# Patient Record
Sex: MALE | Race: WHITE | NOT HISPANIC OR LATINO
[De-identification: names, ages, dates, MRNs, and addresses within clinical notes are randomized per-mention and may not be internally consistent; named-entity substitution may affect disease eponyms.]

---

## 2018-10-13 ENCOUNTER — APPOINTMENT (OUTPATIENT)
Dept: ULTRASOUND IMAGING | Facility: IMAGING CENTER | Age: 78
End: 2018-10-13
Payer: MEDICARE

## 2018-10-13 ENCOUNTER — OUTPATIENT (OUTPATIENT)
Dept: OUTPATIENT SERVICES | Facility: HOSPITAL | Age: 78
LOS: 1 days | End: 2018-10-13
Payer: MEDICARE

## 2018-10-13 DIAGNOSIS — Z00.8 ENCOUNTER FOR OTHER GENERAL EXAMINATION: ICD-10-CM

## 2018-10-13 PROCEDURE — 76770 US EXAM ABDO BACK WALL COMP: CPT

## 2018-10-13 PROCEDURE — 76770 US EXAM ABDO BACK WALL COMP: CPT | Mod: 26

## 2019-04-25 ENCOUNTER — OUTPATIENT (OUTPATIENT)
Dept: OUTPATIENT SERVICES | Facility: HOSPITAL | Age: 79
LOS: 1 days | End: 2019-04-25
Payer: MEDICARE

## 2019-04-25 ENCOUNTER — APPOINTMENT (OUTPATIENT)
Dept: CT IMAGING | Facility: IMAGING CENTER | Age: 79
End: 2019-04-25
Payer: MEDICARE

## 2019-04-25 DIAGNOSIS — R10.9 UNSPECIFIED ABDOMINAL PAIN: ICD-10-CM

## 2019-04-25 PROCEDURE — 74176 CT ABD & PELVIS W/O CONTRAST: CPT

## 2019-04-25 PROCEDURE — 74176 CT ABD & PELVIS W/O CONTRAST: CPT | Mod: 26

## 2021-03-09 ENCOUNTER — RESULT CHARGE (OUTPATIENT)
Age: 81
End: 2021-03-09

## 2021-03-10 ENCOUNTER — NON-APPOINTMENT (OUTPATIENT)
Age: 81
End: 2021-03-10

## 2021-03-10 ENCOUNTER — APPOINTMENT (OUTPATIENT)
Dept: INTERNAL MEDICINE | Facility: CLINIC | Age: 81
End: 2021-03-10
Payer: MEDICARE

## 2021-03-10 ENCOUNTER — LABORATORY RESULT (OUTPATIENT)
Age: 81
End: 2021-03-10

## 2021-03-10 VITALS
OXYGEN SATURATION: 98 % | TEMPERATURE: 97.6 F | BODY MASS INDEX: 31.41 KG/M2 | HEART RATE: 61 BPM | HEIGHT: 64 IN | WEIGHT: 184 LBS | DIASTOLIC BLOOD PRESSURE: 77 MMHG | SYSTOLIC BLOOD PRESSURE: 128 MMHG

## 2021-03-10 DIAGNOSIS — Z78.9 OTHER SPECIFIED HEALTH STATUS: ICD-10-CM

## 2021-03-10 DIAGNOSIS — Z82.49 FAMILY HISTORY OF ISCHEMIC HEART DISEASE AND OTHER DISEASES OF THE CIRCULATORY SYSTEM: ICD-10-CM

## 2021-03-10 PROCEDURE — 36415 COLL VENOUS BLD VENIPUNCTURE: CPT

## 2021-03-10 PROCEDURE — G0439: CPT

## 2021-03-10 RX ORDER — FINASTERIDE 5 MG/1
5 TABLET, FILM COATED ORAL DAILY
Refills: 0 | Status: ACTIVE | COMMUNITY
Start: 2021-03-10

## 2021-03-10 RX ORDER — ALFUZOSIN HYDROCHLORIDE 10 MG/1
10 TABLET, EXTENDED RELEASE ORAL DAILY
Refills: 0 | Status: ACTIVE | COMMUNITY

## 2021-03-10 NOTE — ASSESSMENT
[FreeTextEntry1] : 80 year old male with h/o CAD s/p stent placement in 2016, BPH, presents for initial annual exam.\par Was following with PMD in [place], Dr. Shaikh, but he has recently moved to [place].  To establishing care\par received covid vaccine, \par Overall doing well.  No acute complaints at this time.\par Continues to stay active attending gym several times a week and walking in the neighborhood.  No associated shortness of breath or chest pain\par Up-to-date on all screening tool\par \par CAD, s/p stent placement in 2016, Recently seen by cardiology in January, received clean bill health.\par -Continue meds as directed\par \par BPH.  Recently seen by urologist, Dr. Leroy Candelario, was advised has been retaining some urine.  Has follow-up upcoming\par -Advised to have consult sent over to the office to review\par \par Gout, on allopurinol.  Last attack 17 years ago\par -We'll check uric acid level, may benefit from titrating down allopurinol as on maximum dose\par \par Annual \par -Advised to get yearly Flu shot -up-to-date\par -Advised Yearly Eye exam with Ophthalmologist\par -Advised Yearly Dental exam\par -Educated of the importance of Healthy diet, such as Mediterranean Diet and Exercise, such as walking >20 minutes a day and increasing gradually as tolerated\par \par Preventative screening \par -advised to get colonoscopy for colon cancer [id number]-date\par -will check PSA for prostate cancer screening-labs drawn\par \par Follow-up in 6 months\par \par \par \par \par \par \par \par \par \par \par \par \par

## 2021-03-10 NOTE — HISTORY OF PRESENT ILLNESS
[FreeTextEntry1] : cardio- nick papapietro\par urology- kathy strickland\par opthalmologist- mary kate  [de-identified] : 80 year old male with h/o CAD s/p stent placement in 2016, BPH, presents for initial annual exam.\par Was following with PMD in [place], Dr. Shaikh, but he has recently moved to [place].  To establishing care\par received covid vaccine, \par \par Overall doing well.  No acute complaints at this time.\par \par Recently seen by cardiology in January, received clean bill health.\par \par Recently seen by urology due to BPH.  Was advised has been retaining some urine.  Has follow-up upcoming\par \par Has been compliant with medications\par \par Continues to stay active attending gym several times a week and walking in the neighborhood.  No associated shortness of breath or chest pain\par \par \par \par \par \par

## 2021-03-10 NOTE — PHYSICAL EXAM
[Normal] : normal rate, regular rhythm, normal S1 and S2 and no murmur heard [No Varicosities] : no varicosities [Pedal Pulses Present] : the pedal pulses are present [No Edema] : there was no peripheral edema [No Extremity Clubbing/Cyanosis] : no extremity clubbing/cyanosis [Normal Appearance] : normal in appearance [No Masses] : no palpable masses [Soft] : abdomen soft [Non Tender] : non-tender [Non-distended] : non-distended [Normal Bowel Sounds] : normal bowel sounds [Normal Posterior Cervical Nodes] : no posterior cervical lymphadenopathy [Normal Anterior Cervical Nodes] : no anterior cervical lymphadenopathy [No CVA Tenderness] : no CVA  tenderness [No Spinal Tenderness] : no spinal tenderness [No Joint Swelling] : no joint swelling [Grossly Normal Strength/Tone] : grossly normal strength/tone [No Rash] : no rash [Coordination Grossly Intact] : coordination grossly intact [No Focal Deficits] : no focal deficits [Normal Gait] : normal gait

## 2021-03-10 NOTE — HEALTH RISK ASSESSMENT
[Good] : ~his/her~  mood as  good [No falls in past year] : Patient reported no falls in the past year [0] : 2) Feeling down, depressed, or hopeless: Not at all (0) [No Retinopathy] : No retinopathy [Patient reported colonoscopy was normal] : Patient reported colonoscopy was normal [Retired] : retired [] :  [Feels Safe at Home] : Feels safe at home [Fully functional (bathing, dressing, toileting, transferring, walking, feeding)] : Fully functional (bathing, dressing, toileting, transferring, walking, feeding) [Fully functional (using the telephone, shopping, preparing meals, housekeeping, doing laundry, using] : Fully functional and needs no help or supervision to perform IADLs (using the telephone, shopping, preparing meals, housekeeping, doing laundry, using transportation, managing medications and managing finances) [] : No [[id number]] : 0 [EyeExamDate] : 1/2021 [Change in mental status noted] : No change in mental status noted [Reports changes in hearing] : Reports no changes in hearing [Reports changes in vision] : Reports no changes in vision [Reports changes in dental health] : Reports no changes in dental health [ColonoscopyDate] : 2018

## 2021-03-11 LAB
25(OH)D3 SERPL-MCNC: 42.4 NG/ML
ALBUMIN SERPL ELPH-MCNC: 4.7 G/DL
ALP BLD-CCNC: 78 U/L
ALT SERPL-CCNC: 31 U/L
ANION GAP SERPL CALC-SCNC: 8 MMOL/L
APPEARANCE: CLEAR
AST SERPL-CCNC: 28 U/L
BACTERIA: NEGATIVE
BASOPHILS # BLD AUTO: 0.06 K/UL
BASOPHILS NFR BLD AUTO: 0.7 %
BILIRUB DIRECT SERPL-MCNC: 0.2 MG/DL
BILIRUB INDIRECT SERPL-MCNC: 0.4 MG/DL
BILIRUB SERPL-MCNC: 0.6 MG/DL
BILIRUBIN URINE: NEGATIVE
BLOOD URINE: NEGATIVE
BUN SERPL-MCNC: 27 MG/DL
CALCIUM SERPL-MCNC: 9.9 MG/DL
CHLORIDE SERPL-SCNC: 103 MMOL/L
CHOLEST SERPL-MCNC: 107 MG/DL
CO2 SERPL-SCNC: 29 MMOL/L
COLOR: NORMAL
CREAT SERPL-MCNC: 1.19 MG/DL
EOSINOPHIL # BLD AUTO: 0.22 K/UL
EOSINOPHIL NFR BLD AUTO: 2.7 %
ESTIMATED AVERAGE GLUCOSE: 126 MG/DL
FERRITIN SERPL-MCNC: 357 NG/ML
FOLATE SERPL-MCNC: 13.3 NG/ML
GLUCOSE QUALITATIVE U: NEGATIVE
GLUCOSE SERPL-MCNC: 110 MG/DL
HBA1C MFR BLD HPLC: 6 %
HCT VFR BLD CALC: 48.8 %
HCV AB SER QL: NONREACTIVE
HCV S/CO RATIO: 0.07 S/CO
HDLC SERPL-MCNC: 49 MG/DL
HGB BLD-MCNC: 15.6 G/DL
HIV1+2 AB SPEC QL IA.RAPID: NONREACTIVE
HYALINE CASTS: 0 /LPF
IMM GRANULOCYTES NFR BLD AUTO: 0.4 %
IRON SATN MFR SERPL: 53 %
IRON SERPL-MCNC: 149 UG/DL
KETONES URINE: NEGATIVE
LDLC SERPL CALC-MCNC: 41 MG/DL
LEUKOCYTE ESTERASE URINE: NEGATIVE
LYMPHOCYTES # BLD AUTO: 1.6 K/UL
LYMPHOCYTES NFR BLD AUTO: 19.7 %
MAN DIFF?: NORMAL
MCHC RBC-ENTMCNC: 30.1 PG
MCHC RBC-ENTMCNC: 32 GM/DL
MCV RBC AUTO: 94 FL
MICROSCOPIC-UA: NORMAL
MONOCYTES # BLD AUTO: 0.65 K/UL
MONOCYTES NFR BLD AUTO: 8 %
NEUTROPHILS # BLD AUTO: 5.58 K/UL
NEUTROPHILS NFR BLD AUTO: 68.5 %
NITRITE URINE: NEGATIVE
NONHDLC SERPL-MCNC: 58 MG/DL
PH URINE: 6
PLATELET # BLD AUTO: 399 K/UL
POTASSIUM SERPL-SCNC: 5.4 MMOL/L
PROT SERPL-MCNC: 7.3 G/DL
PROTEIN URINE: NEGATIVE
PSA SERPL-MCNC: 1.46 NG/ML
RBC # BLD: 5.19 M/UL
RBC # FLD: 13 %
RED BLOOD CELLS URINE: 0 /HPF
SODIUM SERPL-SCNC: 140 MMOL/L
SPECIFIC GRAVITY URINE: 1.02
SQUAMOUS EPITHELIAL CELLS: 0 /HPF
TIBC SERPL-MCNC: 282 UG/DL
TRIGL SERPL-MCNC: 85 MG/DL
TSH SERPL-ACNC: 3.76 UIU/ML
UIBC SERPL-MCNC: 133 UG/DL
URATE SERPL-MCNC: 3 MG/DL
UROBILINOGEN URINE: NORMAL
VIT B12 SERPL-MCNC: 559 PG/ML
WBC # FLD AUTO: 8.14 K/UL
WHITE BLOOD CELLS URINE: 1 /HPF

## 2021-03-11 RX ORDER — ALLOPURINOL 300 MG/1
300 TABLET ORAL DAILY
Qty: 90 | Refills: 0 | Status: DISCONTINUED | COMMUNITY
End: 2021-03-11

## 2021-09-08 ENCOUNTER — APPOINTMENT (OUTPATIENT)
Dept: INTERNAL MEDICINE | Facility: CLINIC | Age: 81
End: 2021-09-08
Payer: MEDICARE

## 2021-09-08 VITALS
WEIGHT: 184 LBS | OXYGEN SATURATION: 97 % | BODY MASS INDEX: 31.41 KG/M2 | DIASTOLIC BLOOD PRESSURE: 63 MMHG | HEIGHT: 64 IN | SYSTOLIC BLOOD PRESSURE: 128 MMHG | HEART RATE: 56 BPM

## 2021-09-08 LAB
ALBUMIN SERPL ELPH-MCNC: 4.5 G/DL
ALP BLD-CCNC: 72 U/L
ALT SERPL-CCNC: 36 U/L
ANION GAP SERPL CALC-SCNC: 10 MMOL/L
AST SERPL-CCNC: 33 U/L
BASOPHILS # BLD AUTO: 0.06 K/UL
BASOPHILS NFR BLD AUTO: 0.8 %
BILIRUB DIRECT SERPL-MCNC: 0.2 MG/DL
BILIRUB INDIRECT SERPL-MCNC: 0.4 MG/DL
BILIRUB SERPL-MCNC: 0.6 MG/DL
BUN SERPL-MCNC: 29 MG/DL
CALCIUM SERPL-MCNC: 9.4 MG/DL
CHLORIDE SERPL-SCNC: 105 MMOL/L
CHOLEST SERPL-MCNC: 112 MG/DL
CO2 SERPL-SCNC: 25 MMOL/L
CREAT SERPL-MCNC: 1.16 MG/DL
EOSINOPHIL # BLD AUTO: 0.37 K/UL
EOSINOPHIL NFR BLD AUTO: 5 %
ESTIMATED AVERAGE GLUCOSE: 120 MG/DL
FOLATE SERPL-MCNC: 10.8 NG/ML
GLUCOSE SERPL-MCNC: 98 MG/DL
HBA1C MFR BLD HPLC: 5.8 %
HCT VFR BLD CALC: 44.8 %
HDLC SERPL-MCNC: 50 MG/DL
HGB BLD-MCNC: 15.1 G/DL
IMM GRANULOCYTES NFR BLD AUTO: 0.4 %
LDLC SERPL CALC-MCNC: 42 MG/DL
LYMPHOCYTES # BLD AUTO: 1.5 K/UL
LYMPHOCYTES NFR BLD AUTO: 20.5 %
MAN DIFF?: NORMAL
MCHC RBC-ENTMCNC: 30.6 PG
MCHC RBC-ENTMCNC: 33.7 GM/DL
MCV RBC AUTO: 90.7 FL
MONOCYTES # BLD AUTO: 0.61 K/UL
MONOCYTES NFR BLD AUTO: 8.3 %
NEUTROPHILS # BLD AUTO: 4.76 K/UL
NEUTROPHILS NFR BLD AUTO: 65 %
NONHDLC SERPL-MCNC: 63 MG/DL
PLATELET # BLD AUTO: 342 K/UL
POTASSIUM SERPL-SCNC: 4.7 MMOL/L
PROT SERPL-MCNC: 7 G/DL
RBC # BLD: 4.94 M/UL
RBC # FLD: 12.6 %
SODIUM SERPL-SCNC: 140 MMOL/L
TRIGL SERPL-MCNC: 105 MG/DL
TSH SERPL-ACNC: 4.13 UIU/ML
URATE SERPL-MCNC: 5.3 MG/DL
VIT B12 SERPL-MCNC: 482 PG/ML
WBC # FLD AUTO: 7.33 K/UL

## 2021-09-08 PROCEDURE — 99214 OFFICE O/P EST MOD 30 MIN: CPT | Mod: 25

## 2021-09-08 PROCEDURE — 36415 COLL VENOUS BLD VENIPUNCTURE: CPT

## 2021-09-08 RX ORDER — ASPIRIN 81 MG
81 TABLET, DELAYED RELEASE (ENTERIC COATED) ORAL DAILY
Refills: 2 | Status: ACTIVE | COMMUNITY

## 2021-09-08 NOTE — HISTORY OF PRESENT ILLNESS
[FreeTextEntry1] : Cardio- Dr. Wilson Parra [de-identified] : 81 year old male with h/o CAD s/p stent placement in 2016, BPH, presents for follow-up\par \par received covid vaccine, \par \par Overall doing well.  No acute complaints at this time.\par \par has appointment with cardio tomorrow \par \par Has been compliant with medications, no SE\par \par Continues to stay active swimming in his pool and walking daily.  no associated soboe or cp.\par \par \par \par \par \par

## 2021-09-08 NOTE — ASSESSMENT
[FreeTextEntry1] : 81 year old male with h/o CAD s/p stent placement in 2016, BPH, presents for follow-up\par \par CAD, s/p stent placement in 2016, cardiology-Dr. Parra received clean bill health.\par -Continue meds as directed\par \par BPH.-stable urologist, Dr. Leroy Candelario, \par -cont meds as directed \par \par Gout, on allopurinol.  Last attack 17 years ago.  lowered dose on previous appt.  no recent flares\par -cont allopurinol as directed \par -check uric acid \par \par recommended booster prior to traveling to [place]\par \par \par Follow-up in 4 months\par \par \par \par \par \par \par \par \par \par \par \par \par

## 2021-11-01 ENCOUNTER — APPOINTMENT (OUTPATIENT)
Dept: INTERNAL MEDICINE | Facility: CLINIC | Age: 81
End: 2021-11-01
Payer: MEDICARE

## 2021-11-01 PROCEDURE — 99441: CPT | Mod: 95

## 2021-11-01 NOTE — PHYSICAL EXAM
[de-identified] : Limited due to telehealth.  Speaking in complete sentences in no acute distress

## 2021-11-01 NOTE — HISTORY OF PRESENT ILLNESS
[Home] : at home, [unfilled] , at the time of the visit. [Medical Office: ([facility])___] : at the medical office located in  [Verbal consent obtained from patient] : the patient, [unfilled] [Time Spent: ___ minutes] : I have spent [unfilled] minutes with the patient on the telephone [FreeTextEntry8] : 81 year old male presents complaining of sinus congestion, coughing for the last several days.  He denies any fever, chills, nausea, vomiting, chest pain or palpitations.  He states cough worse at night.  Received flu vaccine about a week ago.\par

## 2021-11-01 NOTE — REVIEW OF SYSTEMS
[Earache] : earache [Nasal Discharge] : nasal discharge [Shortness Of Breath] : no shortness of breath [Wheezing] : no wheezing [Cough] : cough [Dyspnea on Exertion] : not dyspnea on exertion [Negative] : Heme/Lymph

## 2021-11-01 NOTE — ASSESSMENT
[FreeTextEntry1] : will treat for possible sinus infection, advised to followup if symptoms continue.  \par \par -Increase fluids, such as pedialyte, gatorade and ginger ale,  rest.\par -Salt water gargles, ice chips to soathe throat tid.\par -Increase humidity in your home with a vaporizer or humidifier\par -Try a saline nasal mist, such as Simply Saline, Ocean Spray\par -Steam expectoration is recommended\par -Tablespoon of honey with tea twice daily as needed\par -OTC analgesic, Tylenol, ibuprofen as needed.\par -OTC decongestants of choice, as needed\par -Consider use of a decongestant nasal spray such as Flonase, Nasocort/or Nasonex at night to help open nasal/sinus passages for easier breathing and better sinus drainage. \par -Follow-up as needed if symptoms not improved in  3-5 days, sooner if worsens.\par

## 2022-01-11 ENCOUNTER — APPOINTMENT (OUTPATIENT)
Dept: INTERNAL MEDICINE | Facility: CLINIC | Age: 82
End: 2022-01-11
Payer: MEDICARE

## 2022-01-11 VITALS
HEART RATE: 60 BPM | HEIGHT: 64 IN | OXYGEN SATURATION: 98 % | WEIGHT: 183 LBS | DIASTOLIC BLOOD PRESSURE: 70 MMHG | SYSTOLIC BLOOD PRESSURE: 132 MMHG | BODY MASS INDEX: 31.24 KG/M2

## 2022-01-11 PROCEDURE — 99214 OFFICE O/P EST MOD 30 MIN: CPT | Mod: 25

## 2022-01-11 PROCEDURE — 36415 COLL VENOUS BLD VENIPUNCTURE: CPT

## 2022-01-11 RX ORDER — METHYLPREDNISOLONE 4 MG/1
4 TABLET ORAL
Qty: 1 | Refills: 0 | Status: DISCONTINUED | COMMUNITY
Start: 2021-11-15 | End: 2022-01-11

## 2022-01-11 RX ORDER — PROMETHAZINE HYDROCHLORIDE 6.25 MG/5ML
6.25 SOLUTION ORAL
Qty: 100 | Refills: 0 | Status: DISCONTINUED | COMMUNITY
Start: 2021-11-05 | End: 2022-01-11

## 2022-01-11 RX ORDER — FLUTICASONE PROPIONATE 50 UG/1
50 SPRAY, METERED NASAL TWICE DAILY
Qty: 1 | Refills: 0 | Status: DISCONTINUED | COMMUNITY
Start: 2021-11-05 | End: 2022-01-11

## 2022-01-11 RX ORDER — DOXYCYCLINE HYCLATE 100 MG/1
100 TABLET ORAL
Qty: 20 | Refills: 0 | Status: DISCONTINUED | COMMUNITY
Start: 2021-12-06 | End: 2022-01-11

## 2022-01-11 RX ORDER — AZITHROMYCIN 250 MG/1
250 TABLET, FILM COATED ORAL
Qty: 1 | Refills: 0 | Status: DISCONTINUED | COMMUNITY
Start: 2021-11-01 | End: 2022-01-11

## 2022-01-11 NOTE — HISTORY OF PRESENT ILLNESS
[de-identified] : 81 year old male with h/o CAD s/p stent placement in 2016, BPH, presents for follow-up\par \par recently was treated for sinusitis about 1 month ago.  Was treated with azithromycin, doxycycline, Medrol Dosepak and cough medications.  States symptoms have greatly improved but still having persistent intermittent dry cough.  Not associated with lying down, nonproductive.  Denies any shortness of breath on exertion, chest pain.  If CT is normal activities with no restrictions.\par \par Has been compliant with medications, no SE\par \par Continues to stay active swimming in his pool and walking daily.  no associated soboe or cp.\par \par \par \par \par \par

## 2022-01-11 NOTE — ASSESSMENT
[FreeTextEntry1] : \par Persistent cough despite doxycycline, azithromycin, medol dose pack and nasal spray, can be related to PND vs GERD\par -ENT referral, info given \par -OTC analgesic, Tylenol, ibuprofen as needed.\par -OTC decongestants of choice, as needed\par -nasal spray such as Flonase, Nasocort/or Nasonex at night to help open nasal/sinus passages for easier breathing and better sinus drainage. \par \par CAD, s/p stent placement in 2016, cardiology-Dr. Parra received clean bill health.\par -Continue meds as directed\par \par BPH.-stable urologist, Dr. Leroy Candelario, \par -cont meds as directed \par \par Gout, on allopurinol. Last attack 17 years ago. lowered dose on previous appt. no recent flares\par -cont allopurinol as directed \par -check uric acid \par \par f//u in 3 months \par \par \par

## 2022-01-11 NOTE — PHYSICAL EXAM
[Normal] : normal rate, regular rhythm, normal S1 and S2 and no murmur heard [No Varicosities] : no varicosities [Pedal Pulses Present] : the pedal pulses are present [No Edema] : there was no peripheral edema [No Extremity Clubbing/Cyanosis] : no extremity clubbing/cyanosis [Soft] : abdomen soft [Non Tender] : non-tender [Non-distended] : non-distended [Normal Posterior Cervical Nodes] : no posterior cervical lymphadenopathy [Normal Anterior Cervical Nodes] : no anterior cervical lymphadenopathy [No Rash] : no rash [No Focal Deficits] : no focal deficits [de-identified] : Cerumen impaction in right ear

## 2022-01-12 ENCOUNTER — CLINICAL ADVICE (OUTPATIENT)
Age: 82
End: 2022-01-12

## 2022-01-12 LAB
ALBUMIN SERPL ELPH-MCNC: 4.4 G/DL
ALP BLD-CCNC: 81 U/L
ALT SERPL-CCNC: 35 U/L
ANION GAP SERPL CALC-SCNC: 11 MMOL/L
AST SERPL-CCNC: 33 U/L
BASOPHILS # BLD AUTO: 0.06 K/UL
BASOPHILS NFR BLD AUTO: 0.7 %
BILIRUB DIRECT SERPL-MCNC: 0.2 MG/DL
BILIRUB INDIRECT SERPL-MCNC: 0.3 MG/DL
BILIRUB SERPL-MCNC: 0.5 MG/DL
BUN SERPL-MCNC: 31 MG/DL
CALCIUM SERPL-MCNC: 9 MG/DL
CHLORIDE SERPL-SCNC: 105 MMOL/L
CHOLEST SERPL-MCNC: 101 MG/DL
CO2 SERPL-SCNC: 25 MMOL/L
CREAT SERPL-MCNC: 1.18 MG/DL
EOSINOPHIL # BLD AUTO: 0.46 K/UL
EOSINOPHIL NFR BLD AUTO: 5.6 %
ESTIMATED AVERAGE GLUCOSE: 120 MG/DL
FOLATE SERPL-MCNC: 14.1 NG/ML
GLUCOSE SERPL-MCNC: 105 MG/DL
HBA1C MFR BLD HPLC: 5.8 %
HCT VFR BLD CALC: 46.1 %
HDLC SERPL-MCNC: 49 MG/DL
HGB BLD-MCNC: 14.9 G/DL
IMM GRANULOCYTES NFR BLD AUTO: 0.5 %
LDLC SERPL CALC-MCNC: 36 MG/DL
LYMPHOCYTES # BLD AUTO: 1.68 K/UL
LYMPHOCYTES NFR BLD AUTO: 20.4 %
MAN DIFF?: NORMAL
MCHC RBC-ENTMCNC: 30.3 PG
MCHC RBC-ENTMCNC: 32.3 GM/DL
MCV RBC AUTO: 93.9 FL
MONOCYTES # BLD AUTO: 0.68 K/UL
MONOCYTES NFR BLD AUTO: 8.3 %
NEUTROPHILS # BLD AUTO: 5.3 K/UL
NEUTROPHILS NFR BLD AUTO: 64.5 %
NONHDLC SERPL-MCNC: 53 MG/DL
PLATELET # BLD AUTO: 328 K/UL
POTASSIUM SERPL-SCNC: 5.2 MMOL/L
PROT SERPL-MCNC: 6.6 G/DL
RBC # BLD: 4.91 M/UL
RBC # FLD: 12.9 %
SODIUM SERPL-SCNC: 141 MMOL/L
TRIGL SERPL-MCNC: 81 MG/DL
TSH SERPL-ACNC: 3.13 UIU/ML
URATE SERPL-MCNC: 5.3 MG/DL
VIT B12 SERPL-MCNC: 518 PG/ML
WBC # FLD AUTO: 8.22 K/UL

## 2022-04-13 ENCOUNTER — RX RENEWAL (OUTPATIENT)
Age: 82
End: 2022-04-13

## 2022-05-16 ENCOUNTER — APPOINTMENT (OUTPATIENT)
Dept: INTERNAL MEDICINE | Facility: CLINIC | Age: 82
End: 2022-05-16
Payer: MEDICARE

## 2022-05-16 ENCOUNTER — INPATIENT (INPATIENT)
Facility: HOSPITAL | Age: 82
LOS: 2 days | Discharge: HOME | End: 2022-05-19
Attending: HOSPITALIST | Admitting: HOSPITALIST
Payer: MEDICARE

## 2022-05-16 ENCOUNTER — RESULT REVIEW (OUTPATIENT)
Age: 82
End: 2022-05-16

## 2022-05-16 ENCOUNTER — OUTPATIENT (OUTPATIENT)
Dept: OUTPATIENT SERVICES | Facility: HOSPITAL | Age: 82
LOS: 1 days | End: 2022-05-16
Payer: MEDICARE

## 2022-05-16 ENCOUNTER — APPOINTMENT (OUTPATIENT)
Dept: CT IMAGING | Facility: IMAGING CENTER | Age: 82
End: 2022-05-16
Payer: MEDICARE

## 2022-05-16 VITALS
SYSTOLIC BLOOD PRESSURE: 118 MMHG | DIASTOLIC BLOOD PRESSURE: 67 MMHG | WEIGHT: 173 LBS | BODY MASS INDEX: 29.53 KG/M2 | HEART RATE: 64 BPM | HEIGHT: 64 IN

## 2022-05-16 VITALS
OXYGEN SATURATION: 99 % | RESPIRATION RATE: 18 BRPM | DIASTOLIC BLOOD PRESSURE: 124 MMHG | SYSTOLIC BLOOD PRESSURE: 220 MMHG | TEMPERATURE: 98 F | HEART RATE: 79 BPM

## 2022-05-16 DIAGNOSIS — Z95.5 PRESENCE OF CORONARY ANGIOPLASTY IMPLANT AND GRAFT: Chronic | ICD-10-CM

## 2022-05-16 DIAGNOSIS — R10.9 UNSPECIFIED ABDOMINAL PAIN: ICD-10-CM

## 2022-05-16 DIAGNOSIS — I10 ESSENTIAL (PRIMARY) HYPERTENSION: ICD-10-CM

## 2022-05-16 DIAGNOSIS — K59.00 CONSTIPATION, UNSPECIFIED: ICD-10-CM

## 2022-05-16 DIAGNOSIS — J47.9 BRONCHIECTASIS, UNCOMPLICATED: ICD-10-CM

## 2022-05-16 DIAGNOSIS — K52.9 NONINFECTIVE GASTROENTERITIS AND COLITIS, UNSPECIFIED: ICD-10-CM

## 2022-05-16 DIAGNOSIS — N40.0 BENIGN PROSTATIC HYPERPLASIA WITHOUT LOWER URINARY TRACT SYMPTOMS: ICD-10-CM

## 2022-05-16 DIAGNOSIS — E78.5 HYPERLIPIDEMIA, UNSPECIFIED: ICD-10-CM

## 2022-05-16 DIAGNOSIS — M10.9 GOUT, UNSPECIFIED: ICD-10-CM

## 2022-05-16 DIAGNOSIS — Z29.9 ENCOUNTER FOR PROPHYLACTIC MEASURES, UNSPECIFIED: ICD-10-CM

## 2022-05-16 DIAGNOSIS — I25.10 ATHEROSCLEROTIC HEART DISEASE OF NATIVE CORONARY ARTERY WITHOUT ANGINA PECTORIS: ICD-10-CM

## 2022-05-16 DIAGNOSIS — N28.1 CYST OF KIDNEY, ACQUIRED: ICD-10-CM

## 2022-05-16 DIAGNOSIS — Z98.890 OTHER SPECIFIED POSTPROCEDURAL STATES: Chronic | ICD-10-CM

## 2022-05-16 LAB
ALBUMIN SERPL ELPH-MCNC: 4.5 G/DL
ALBUMIN SERPL ELPH-MCNC: 4.6 G/DL — SIGNIFICANT CHANGE UP (ref 3.3–5)
ALP BLD-CCNC: 71 U/L
ALP SERPL-CCNC: 72 U/L — SIGNIFICANT CHANGE UP (ref 40–120)
ALT FLD-CCNC: 23 U/L — SIGNIFICANT CHANGE UP (ref 4–41)
ALT SERPL-CCNC: 21 U/L
ANION GAP SERPL CALC-SCNC: 10 MMOL/L
ANION GAP SERPL CALC-SCNC: 12 MMOL/L — SIGNIFICANT CHANGE UP (ref 7–14)
APTT BLD: 29.1 SEC — SIGNIFICANT CHANGE UP (ref 27–36.3)
AST SERPL-CCNC: 25 U/L
AST SERPL-CCNC: 28 U/L — SIGNIFICANT CHANGE UP (ref 4–40)
BASOPHILS # BLD AUTO: 0.06 K/UL — SIGNIFICANT CHANGE UP (ref 0–0.2)
BASOPHILS NFR BLD AUTO: 0.6 % — SIGNIFICANT CHANGE UP (ref 0–2)
BILIRUB SERPL-MCNC: 0.6 MG/DL
BILIRUB SERPL-MCNC: 0.7 MG/DL — SIGNIFICANT CHANGE UP (ref 0.2–1.2)
BLD GP AB SCN SERPL QL: NEGATIVE — SIGNIFICANT CHANGE UP
BLOOD GAS VENOUS COMPREHENSIVE RESULT: SIGNIFICANT CHANGE UP
BUN SERPL-MCNC: 21 MG/DL — SIGNIFICANT CHANGE UP (ref 7–23)
BUN SERPL-MCNC: 24 MG/DL
CALCIUM SERPL-MCNC: 9.2 MG/DL — SIGNIFICANT CHANGE UP (ref 8.4–10.5)
CALCIUM SERPL-MCNC: 9.4 MG/DL
CHLORIDE SERPL-SCNC: 102 MMOL/L — SIGNIFICANT CHANGE UP (ref 98–107)
CHLORIDE SERPL-SCNC: 104 MMOL/L
CO2 SERPL-SCNC: 26 MMOL/L — SIGNIFICANT CHANGE UP (ref 22–31)
CO2 SERPL-SCNC: 28 MMOL/L
CREAT SERPL-MCNC: 1.11 MG/DL
CREAT SERPL-MCNC: 1.13 MG/DL — SIGNIFICANT CHANGE UP (ref 0.5–1.3)
EGFR: 64 ML/MIN/1.73M2 — SIGNIFICANT CHANGE UP
EGFR: 66 ML/MIN/1.73M2
EOSINOPHIL # BLD AUTO: 0.44 K/UL — SIGNIFICANT CHANGE UP (ref 0–0.5)
EOSINOPHIL NFR BLD AUTO: 4.7 % — SIGNIFICANT CHANGE UP (ref 0–6)
GLUCOSE SERPL-MCNC: 104 MG/DL
GLUCOSE SERPL-MCNC: 96 MG/DL — SIGNIFICANT CHANGE UP (ref 70–99)
HCT VFR BLD CALC: 44.2 % — SIGNIFICANT CHANGE UP (ref 39–50)
HGB BLD-MCNC: 14.8 G/DL — SIGNIFICANT CHANGE UP (ref 13–17)
IANC: 6.28 K/UL — SIGNIFICANT CHANGE UP (ref 1.8–7.4)
IMM GRANULOCYTES NFR BLD AUTO: 0.3 % — SIGNIFICANT CHANGE UP (ref 0–1.5)
INR BLD: 1.07 RATIO — SIGNIFICANT CHANGE UP (ref 0.88–1.16)
LIDOCAIN IGE QN: 19 U/L — SIGNIFICANT CHANGE UP (ref 7–60)
LYMPHOCYTES # BLD AUTO: 1.87 K/UL — SIGNIFICANT CHANGE UP (ref 1–3.3)
LYMPHOCYTES # BLD AUTO: 19.8 % — SIGNIFICANT CHANGE UP (ref 13–44)
MAGNESIUM SERPL-MCNC: 1.9 MG/DL — SIGNIFICANT CHANGE UP (ref 1.6–2.6)
MCHC RBC-ENTMCNC: 30.5 PG — SIGNIFICANT CHANGE UP (ref 27–34)
MCHC RBC-ENTMCNC: 33.5 GM/DL — SIGNIFICANT CHANGE UP (ref 32–36)
MCV RBC AUTO: 90.9 FL — SIGNIFICANT CHANGE UP (ref 80–100)
MONOCYTES # BLD AUTO: 0.75 K/UL — SIGNIFICANT CHANGE UP (ref 0–0.9)
MONOCYTES NFR BLD AUTO: 8 % — SIGNIFICANT CHANGE UP (ref 2–14)
NEUTROPHILS # BLD AUTO: 6.28 K/UL — SIGNIFICANT CHANGE UP (ref 1.8–7.4)
NEUTROPHILS NFR BLD AUTO: 66.6 % — SIGNIFICANT CHANGE UP (ref 43–77)
NRBC # BLD: 0 /100 WBCS — SIGNIFICANT CHANGE UP
NRBC # FLD: 0 K/UL — SIGNIFICANT CHANGE UP
PHOSPHATE SERPL-MCNC: 3.3 MG/DL — SIGNIFICANT CHANGE UP (ref 2.5–4.5)
PLATELET # BLD AUTO: 377 K/UL — SIGNIFICANT CHANGE UP (ref 150–400)
POTASSIUM SERPL-MCNC: 3.7 MMOL/L — SIGNIFICANT CHANGE UP (ref 3.5–5.3)
POTASSIUM SERPL-SCNC: 3.7 MMOL/L — SIGNIFICANT CHANGE UP (ref 3.5–5.3)
POTASSIUM SERPL-SCNC: 4.9 MMOL/L
PROT SERPL-MCNC: 6.6 G/DL
PROT SERPL-MCNC: 7.2 G/DL — SIGNIFICANT CHANGE UP (ref 6–8.3)
PROTHROM AB SERPL-ACNC: 12.4 SEC — SIGNIFICANT CHANGE UP (ref 10.5–13.4)
RBC # BLD: 4.86 M/UL — SIGNIFICANT CHANGE UP (ref 4.2–5.8)
RBC # FLD: 12.5 % — SIGNIFICANT CHANGE UP (ref 10.3–14.5)
RH IG SCN BLD-IMP: POSITIVE — SIGNIFICANT CHANGE UP
SARS-COV-2 RNA SPEC QL NAA+PROBE: SIGNIFICANT CHANGE UP
SODIUM SERPL-SCNC: 140 MMOL/L — SIGNIFICANT CHANGE UP (ref 135–145)
SODIUM SERPL-SCNC: 142 MMOL/L
WBC # BLD: 9.43 K/UL — SIGNIFICANT CHANGE UP (ref 3.8–10.5)
WBC # FLD AUTO: 9.43 K/UL — SIGNIFICANT CHANGE UP (ref 3.8–10.5)

## 2022-05-16 PROCEDURE — G1004: CPT

## 2022-05-16 PROCEDURE — 99285 EMERGENCY DEPT VISIT HI MDM: CPT | Mod: 25,GC

## 2022-05-16 PROCEDURE — 99214 OFFICE O/P EST MOD 30 MIN: CPT | Mod: 25

## 2022-05-16 PROCEDURE — 74176 CT ABD & PELVIS W/O CONTRAST: CPT | Mod: 26,MG

## 2022-05-16 PROCEDURE — 36415 COLL VENOUS BLD VENIPUNCTURE: CPT | Mod: PD

## 2022-05-16 PROCEDURE — 93010 ELECTROCARDIOGRAM REPORT: CPT | Mod: GC

## 2022-05-16 PROCEDURE — 74174 CTA ABD&PLVS W/CONTRAST: CPT | Mod: 26

## 2022-05-16 PROCEDURE — 99223 1ST HOSP IP/OBS HIGH 75: CPT

## 2022-05-16 PROCEDURE — 74176 CT ABD & PELVIS W/O CONTRAST: CPT | Mod: MG

## 2022-05-16 RX ORDER — CLOPIDOGREL BISULFATE 75 MG/1
1 TABLET, FILM COATED ORAL
Qty: 0 | Refills: 0 | DISCHARGE

## 2022-05-16 RX ORDER — SODIUM CHLORIDE 9 MG/ML
3 INJECTION INTRAMUSCULAR; INTRAVENOUS; SUBCUTANEOUS EVERY 8 HOURS
Refills: 0 | Status: DISCONTINUED | OUTPATIENT
Start: 2022-05-16 | End: 2022-05-19

## 2022-05-16 RX ORDER — LISINOPRIL 2.5 MG/1
5 TABLET ORAL DAILY
Refills: 0 | Status: DISCONTINUED | OUTPATIENT
Start: 2022-05-17 | End: 2022-05-19

## 2022-05-16 RX ORDER — ALFUZOSIN HYDROCHLORIDE 10 MG/1
1 TABLET, EXTENDED RELEASE ORAL
Qty: 0 | Refills: 0 | DISCHARGE

## 2022-05-16 RX ORDER — LISINOPRIL 2.5 MG/1
1 TABLET ORAL
Qty: 0 | Refills: 0 | DISCHARGE

## 2022-05-16 RX ORDER — FAMOTIDINE 10 MG/ML
1 INJECTION INTRAVENOUS
Qty: 0 | Refills: 0 | DISCHARGE

## 2022-05-16 RX ORDER — ASPIRIN/CALCIUM CARB/MAGNESIUM 324 MG
1 TABLET ORAL
Qty: 0 | Refills: 0 | DISCHARGE

## 2022-05-16 RX ORDER — ATORVASTATIN CALCIUM 80 MG/1
80 TABLET, FILM COATED ORAL AT BEDTIME
Refills: 0 | Status: DISCONTINUED | OUTPATIENT
Start: 2022-05-16 | End: 2022-05-19

## 2022-05-16 RX ORDER — CLOPIDOGREL BISULFATE 75 MG/1
75 TABLET, FILM COATED ORAL DAILY
Refills: 0 | Status: DISCONTINUED | OUTPATIENT
Start: 2022-05-16 | End: 2022-05-19

## 2022-05-16 RX ORDER — ROSUVASTATIN CALCIUM 5 MG/1
1 TABLET ORAL
Qty: 0 | Refills: 0 | DISCHARGE

## 2022-05-16 RX ORDER — METOPROLOL TARTRATE 50 MG
50 TABLET ORAL DAILY
Refills: 0 | Status: DISCONTINUED | OUTPATIENT
Start: 2022-05-17 | End: 2022-05-19

## 2022-05-16 RX ORDER — ASPIRIN/CALCIUM CARB/MAGNESIUM 324 MG
81 TABLET ORAL DAILY
Refills: 0 | Status: DISCONTINUED | OUTPATIENT
Start: 2022-05-16 | End: 2022-05-19

## 2022-05-16 RX ORDER — ENOXAPARIN SODIUM 100 MG/ML
40 INJECTION SUBCUTANEOUS EVERY 24 HOURS
Refills: 0 | Status: DISCONTINUED | OUTPATIENT
Start: 2022-05-16 | End: 2022-05-19

## 2022-05-16 RX ORDER — METOPROLOL TARTRATE 50 MG
1 TABLET ORAL
Qty: 0 | Refills: 0 | DISCHARGE

## 2022-05-16 RX ORDER — ALLOPURINOL 300 MG
100 TABLET ORAL DAILY
Refills: 0 | Status: DISCONTINUED | OUTPATIENT
Start: 2022-05-16 | End: 2022-05-19

## 2022-05-16 RX ORDER — EZETIMIBE 10 MG/1
1 TABLET ORAL
Qty: 0 | Refills: 0 | DISCHARGE

## 2022-05-16 RX ORDER — TAMSULOSIN HYDROCHLORIDE 0.4 MG/1
0.8 CAPSULE ORAL AT BEDTIME
Refills: 0 | Status: DISCONTINUED | OUTPATIENT
Start: 2022-05-16 | End: 2022-05-19

## 2022-05-16 RX ORDER — ALLOPURINOL 300 MG
1 TABLET ORAL
Qty: 0 | Refills: 0 | DISCHARGE

## 2022-05-16 NOTE — H&P ADULT - HISTORY OF PRESENT ILLNESS
83 y/o F with PMH of CAD (s/p 8 stents, on Aspirin and plavix), Hypertension, hyperlipidemia presents to the ED complaining of lower abdominal pain. 83 y/o F with PMH of CAD (s/p 1 stent in 2016, on Aspirin and plavix), Hypertension, hyperlipidemia presents to the ED complaining of lower abdominal pain. Patient states that pain started on Wednesday May 11th. Patient describe pain as being lower abdomen, in right lower quadrant and left lower quadrant. Patient describes pain as intermittent and sharp and denies radiation. Patient denies pain being exacerbated by eating. Patient reports 1 episode of NBNB vomiting on Wednesday. Patient states that he has not had a bowel movement since Wednesday but endorses that he is able to pass gas. Patient reports that he went to his PCP today who ordered CT scan which he say showed concern for mesenteric ischemia and patient was sent to the ED. Patient reports normal colonoscopy 4 years ago. Patient reports normal appetite. Patient denies fever, chills, chest pain, shortness of breath, diarrhea.    CTAP w/oral contrast showed Segmental concentric wall thickening and adjacent stranding of the proximal and mid descending colon. Given the anatomic distribution and evidence of atherosclerotic disease, differential considerations include watershed ischemic colitis. Surgery was consulted by ED team and per ED note recommended CTA abdomen and pelvis. CTA preliminary report showed redemonstration of short segment mild wall thickening and adjacent fat stranding of the proximal and mid descending colon with normal enhancement. No portal venous gas or pneumatosis. Celiac artery, SMA and MICHELLE are patent with atherosclerotic plaque noted in the SMA and celiac ostia resulting in mild narrowing. Other findings as discussed in prior CT from the same day.      83 y/o F with PMH of CAD (s/p 1 stent in 2016, on Aspirin and plavix), Hypertension, hyperlipidemia, Diverticulosis presents to the ED complaining of lower abdominal pain. Patient states that pain started on Wednesday May 11th. Patient describe pain as being lower abdomen, in right lower quadrant and left lower quadrant. Patient describes pain as intermittent and sharp and denies radiation. Patient denies pain being exacerbated by eating. Patient reports 1 episode of NBNB vomiting on Wednesday. Patient states that he has not had a bowel movement since Wednesday but endorses that he is able to pass gas. Patient reports that he went to his PCP today who ordered CT scan which he say showed concern for mesenteric ischemia and patient was sent to the ED. Patient reports normal colonoscopy 4 years ago. Patient reports normal appetite. Patient denies fever, chills, chest pain, shortness of breath, diarrhea.    CTAP w/oral contrast showed Segmental concentric wall thickening and adjacent stranding of the proximal and mid descending colon. Given the anatomic distribution and evidence of atherosclerotic disease, differential considerations include watershed ischemic colitis. Surgery was consulted by ED team and per ED note recommended CTA abdomen and pelvis. CTA preliminary report showed redemonstration of short segment mild wall thickening and adjacent fat stranding of the proximal and mid descending colon with normal enhancement. No portal venous gas or pneumatosis. Celiac artery, SMA and MICHELLE are patent with atherosclerotic plaque noted in the SMA and celiac ostia resulting in mild narrowing. Other findings as discussed in prior CT from the same day.      81 y/o F with PMH of CAD (s/p 1 stent in 2016, on Aspirin and plavix), Hypertension, hyperlipidemia, Diverticulosis, LBBB presents to the ED complaining of lower abdominal pain. Patient states that pain started on Wednesday May 11th. Patient describe pain as being lower abdomen, in right lower quadrant and left lower quadrant. Patient describes pain as intermittent and sharp and denies radiation. Patient denies pain being exacerbated by eating. Patient reports 1 episode of NBNB vomiting on Wednesday. Patient states that he has not had a bowel movement since Wednesday but endorses that he is able to pass gas. Patient reports that he went to his PCP today who ordered CT scan which he say showed concern for mesenteric ischemia and patient was sent to the ED. Patient reports normal colonoscopy 4 years ago. Patient reports normal appetite. Patient denies fever, chills, chest pain, shortness of breath, diarrhea.    CTAP w/oral contrast showed Segmental concentric wall thickening and adjacent stranding of the proximal and mid descending colon. Given the anatomic distribution and evidence of atherosclerotic disease, differential considerations include watershed ischemic colitis. Surgery was consulted by ED team and per ED note recommended CTA abdomen and pelvis. CTA preliminary report showed redemonstration of short segment mild wall thickening and adjacent fat stranding of the proximal and mid descending colon with normal enhancement. No portal venous gas or pneumatosis. Celiac artery, SMA and MICHELLE are patent with atherosclerotic plaque noted in the SMA and celiac ostia resulting in mild narrowing. Other findings as discussed in prior CT from the same day.      81 y/o M with PMH of CAD (s/p 1 stent in 2016, on Aspirin and plavix), Hypertension, hyperlipidemia, Diverticulosis, LBBB presents to the ED complaining of lower abdominal pain. Patient states that pain started on Wednesday May 11th. Patient describe pain as being lower abdomen, in right lower quadrant and left lower quadrant. Patient describes pain as intermittent and sharp and denies radiation. Patient denies pain being exacerbated by eating. Patient reports 1 episode of NBNB vomiting on Wednesday. Patient states that he has not had a bowel movement since Wednesday but endorses that he is able to pass gas. Patient reports that he went to his PCP today who ordered CT scan which he say showed concern for mesenteric ischemia and patient was sent to the ED. Patient reports normal colonoscopy 4 years ago. Patient reports normal appetite. Patient denies fever, chills, chest pain, shortness of breath, diarrhea.    CTAP w/oral contrast showed Segmental concentric wall thickening and adjacent stranding of the proximal and mid descending colon. Given the anatomic distribution and evidence of atherosclerotic disease, differential considerations include watershed ischemic colitis. Surgery was consulted by ED team and per ED note recommended CTA abdomen and pelvis. CTA preliminary report showed redemonstration of short segment mild wall thickening and adjacent fat stranding of the proximal and mid descending colon with normal enhancement. No portal venous gas or pneumatosis. Celiac artery, SMA and MICHELLE are patent with atherosclerotic plaque noted in the SMA and celiac ostia resulting in mild narrowing. Other findings as discussed in prior CT from the same day.      83 y/o Male with MHx of CAD (s/p 1 stent in 2016, on Aspirin and plavix), Hypertension, hyperlipidemia, Diverticulosis, LBBB presents to the ED complaining of lower abdominal pain. Patient states that pain started on Wednesday May 11th. Patient describe pain as being lower abdomen, in right lower quadrant and left lower quadrant. Patient describes pain as intermittent and sharp and denies radiation. Patient says that the pain is not exacerbated postprandially. Patient reports 1 episode of NBNB vomiting on Wednesday. Patient states that he has not had a bowel movement since Wednesday but endorses that he is able to pass gas. Patient reports that he went to his PCP today who ordered CT scan which he say showed concern for "mesenteric ischemia" and patient was sent to the ED. Patient reports normal colonoscopy 4 years ago. Patient reports normal appetite. Patient denies fever, chills, chest pain, shortness of breath, diarrhea.    ED course: vasc surgery was consulted

## 2022-05-16 NOTE — PHYSICAL EXAM
[Diminished] : diminished [RLQ] : in the right lower quadrant [LLQ] : in the left lower quadrant [Normal] : affect was normal and insight and judgment were intact

## 2022-05-16 NOTE — H&P ADULT - NSHPPHYSICALEXAM_GEN_ALL_CORE
Vital Signs Last 24 Hrs  T(C): 36.7 (16 May 2022 19:55), Max: 36.7 (16 May 2022 16:46)  T(F): 98 (16 May 2022 19:55), Max: 98.1 (16 May 2022 16:46)  HR: 72 (16 May 2022 19:55) (64 - 79)  BP: 145/63 (16 May 2022 19:55) (145/63 - 220/124)  BP(mean): --  RR: 18 (16 May 2022 19:55) (18 - 18)  SpO2: 100% (16 May 2022 19:55) (96% - 100%)

## 2022-05-16 NOTE — CONSULT NOTE ADULT - SUBJECTIVE AND OBJECTIVE BOX
HPI:   81 y/o F with PMH of CAD (s/p 1 stent in 2016, on Aspirin and Plavix), HTN, HLD, diverticulosis presents to the ED complaining of lower abdominal pain. Pt states pain started 1 week ago and has recurred daily, mostly in the evenings. He states pain is cramping and feels like he is going to have a BM, however his last BM was 1 week ago. He endorses flatus. Pt states pain not post prandial and has not noted a specific trigger. Pt reports 1 episode of NBNB vomiting after onset of pain. After that he states he has had mild nausea intermittently but no emesis and is tolerating a diet. Pt went to his PCP today who ordered CT scan which he he reports was concerning for ischemic colitis and/or mesenteric ischemia given distribution of atherosclerotic disease. Patient reports normal colonoscopy 4 years ago. H denies fever, chills, chest pain, shortness of breath, diarrhea, urinary symptoms, recent illness or sick contacts.     In ED pt afebrile, HD stable. Labs with no leukocytosis and normal lactate; otherwise unremarkable. Outpatient CT w/ oral contrast showed atherosclerotic disease, particularly at origin of mesenteric vessels. Also, noted segmental concentric wall thickening and adjacent stranding of the proximal and mid descending colon. Vascular surgery consult to evaluate for possible mesenteric ischemia.     PAST MEDICAL & SURGICAL HISTORY:  CAD (coronary artery disease)  1 stent, placed in 2016      Hyperlipidemia      Gout      BPH (benign prostatic hyperplasia)      History of hand surgery      Stented coronary artery          ROS: Negative except for HPI    MEDICATIONS:  aspirin enteric coated 81 milliGRAM(s) Oral daily  clopidogrel Tablet 75 milliGRAM(s) Oral daily  enoxaparin Injectable 40 milliGRAM(s) SubCutaneous every 24 hours      tamsulosin 0.8 milliGRAM(s) Oral at bedtime      allopurinol 100 milliGRAM(s) Oral daily  atorvastatin 80 milliGRAM(s) Oral at bedtime  sodium chloride 0.9% lock flush 3 milliLiter(s) IV Push every 8 hours      Allergies    No Known Allergies    Intolerances        SOCIAL HISTORY: Denies tobacco, social ETOH, denies illicit drug use    FAMILY HISTORY:  FH: CAD (coronary artery disease)        Vital Signs Last 24 Hrs  T(C): 36.7 (16 May 2022 19:55), Max: 36.7 (16 May 2022 16:46)  T(F): 98 (16 May 2022 19:55), Max: 98.1 (16 May 2022 16:46)  HR: 72 (16 May 2022 19:55) (64 - 79)  BP: 145/63 (16 May 2022 19:55) (145/63 - 220/124)  BP(mean): --  RR: 18 (16 May 2022 19:55) (18 - 18)  SpO2: 100% (16 May 2022 19:55) (96% - 100%)    PHYSICAL EXAM:    Constitutional: NAD well-developed  HEENT: PERRLA, EOMI  Neck: Supple   Respiratory: Unlabored    Cardiovascular: RRR  Gastrointestinal: soft, mild b/l lower quadrant TTP. No rebound or guarding.   Extremities: No peripheral edema  Vascular: 2+ peripheral pulses  Neurological: A/O x 3      LABS:                        14.8   9.43  )-----------( 377      ( 16 May 2022 18:00 )             44.2     05-16    140  |  102  |  21  ----------------------------<  96  3.7   |  26  |  1.13    Ca    9.2      16 May 2022 18:00  Phos  3.3     05-16  Mg     1.90     05-16    TPro  7.2  /  Alb  4.6  /  TBili  0.7  /  DBili  x   /  AST  28  /  ALT  23  /  AlkPhos  72  05-16    PT/INR - ( 16 May 2022 18:00 )   PT: 12.4 sec;   INR: 1.07 ratio         PTT - ( 16 May 2022 18:00 )  PTT:29.1 sec        RADIOLOGY & ADDITIONAL STUDIES:      EXAM: [id number] - CT ABDOMEN AND PELVIS OC  - ORDERED BY: YVONNE RENAE      PROCEDURE DATE:  05/16/2022           INTERPRETATION:  CLINICAL INFORMATION: Constipation.    COMPARISON: April 25, 2019..    CONTRAST/COMPLICATIONS:  IV Contrast:  Oral Contrast: Smoothie Readi-Cat 2  Complications: None reported at time of study completion    PROCEDURE:  CT of the Abdomen and Pelvis was performed.  Sagittal and coronal reformats were performed.    FINDINGS:  LOWER CHEST: Moderate subpleural reticulationwith traction   bronchiectasis, compatible with interstitial fibrosis. No honeycombing is   identified. Small hiatal hernia. Coronary artery stent.    LIVER: Within normal limits.  BILE DUCTS: Normal caliber.  GALLBLADDER: Within normal limits.  SPLEEN: Within normal limits.  PANCREAS: Within normal limits.  ADRENALS: Within normal limits.  KIDNEYS/URETERS: Bilateral renal cysts measuring up to 6.1 cm arising   from the lateral midpole of the right kidney. Otherwise, within normal   limits.    BLADDER: Within normal limits.  REPRODUCTIVE ORGANS: The prostate is enlarged.    BOWEL: There is moderate stool throughout the colon. There is   diverticulosis of the sigmoid colon. There is mild concentric wall   thickening of the collapsed proximal and mid descending colon with mild   adjacent stranding. There is no evidence of pneumatosis or extraluminal   gas.    Remaining small and large bowel loops are unremarkable with no evidence   of obstruction, bowel wall thickening, or inflammatory stranding of the   adjacent mesenteric fat.  PERITONEUM: No ascites.  VESSELS: There is moderate atherosclerotic calcification of the abdominal   aorta, most prominent at the origins of the mesenteric arteries.  RETROPERITONEUM/LYMPH NODES: No lymphadenopathy.  ABDOMINAL WALL: Within normal limits.  BONES: Mild anterolisthesis of L4 on L5.    IMPRESSION: Segmental concentric wall thickening and adjacent stranding   of the proximal and mid descending colon. Given the anatomic distribution   and evidence of atherosclerotic disease, differential considerations   include watershed ischemic colitis. Recommend dedicated MRA of the   mesenteric arteries and vascular surgery consultation. Results discussed   with Dr. Yvonne Renae at time of interpretation.

## 2022-05-16 NOTE — H&P ADULT - NSHPSOCIALHISTORY_GEN_ALL_CORE
Patient denies use of cigarettes. Patient states he drinks alcohol occasionally. Patient denies h/o cigarettes use  Ambulates without assistive devices   Retired  Former   Uses alcohol socially only

## 2022-05-16 NOTE — H&P ADULT - ASSESSMENT
83 y/o F with PMH of CAD (s/p 1 stent in 2016, on Aspirin and plavix), Hypertension, hyperlipidemia presents to the ED complaining of lower abdominal pain. Concern for colitis vs mesenteric ischemia. 81 y/o F with PMH of CAD (s/p 1 stent in 2016, on Aspirin and plavix), Hypertension, hyperlipidemia, diverticulosis, LBBB presents to the ED complaining of lower abdominal pain. Concern for colitis vs mesenteric ischemia. 83 y/o M with PMH of CAD (s/p 1 stent in 2016, on Aspirin and plavix), Hypertension, hyperlipidemia, diverticulosis, LBBB presents to the ED complaining of lower abdominal pain. Concern for colitis vs mesenteric ischemia. 81 y/o Male with MHx of CAD (s/p 1 stent in 2016, on Aspirin and Plavix), Hypertension, hyperlipidemia, Diverticulosis, LBBB a/w lower abdominal pain due to acute colitis of unclear etiology vs mesenteric ischemia. Incidental finding of bronchiectasis and bilateral renal cysts;

## 2022-05-16 NOTE — H&P ADULT - PROBLEM SELECTOR PLAN 5
Continue alfuzosin 10 mg daily.  Monitor for retention. Patient on lisinopril and metoprolol succinate 50 mg ER.  Will continue with hold parameters.

## 2022-05-16 NOTE — H&P ADULT - NSICDXPASTMEDICALHX_GEN_ALL_CORE_FT
PAST MEDICAL HISTORY:  BPH (benign prostatic hyperplasia)     CAD (coronary artery disease) 1 stent, placed in 2016    Gout     Hyperlipidemia

## 2022-05-16 NOTE — ED ADULT NURSE NOTE - OBJECTIVE STATEMENT
Pt received to  3 , awake and alert, A&OX4, ambulatory. C/o RLQ and LLQ abd pain that began Wednesday. States he had one episode of non bloody/nonbilious emesis on Wednesday, none since. States he went to PCP bc lower abd pain has not been relieved since Wed and he has not had any bowel movements since Wednesday. Last BM Wed, regular. States PCP instructed him to come to ED after CT showed ischemic bowel. Respirations even and unlabored. Resting comfortably. Denies CP, SOB, N/V, HA, dizziness, palpitations, fatigue. 18G IV placed to  RAC   . Bed in lowest position, call bell within reach.

## 2022-05-16 NOTE — ED PROVIDER NOTE - PROGRESS NOTE DETAILS
Ford, PGY3 - pt seen and evaluated by surgery bedside, discussed w/ attending, low suspicion for mesenteric ischemia at this time, but recommend CTA abd/pelvis to r/o. also recommend GI colonoscopy for further eval of ischemic colitis. GI emailed, CTA ordered, pt signed out to Dr. Ferrara, agrees admit.

## 2022-05-16 NOTE — ED PROVIDER NOTE - ATTENDING CONTRIBUTION TO CARE
I performed a face-to-face evaluation of the patient and performed a history and physical examination. I agree with the history and physical examination. If this was a PA visit, I personally saw the patient with the PA and performed a substantive portion of the visit including all aspects of the medical decision making.    Concern for atherosclerotic mesenteric ischemia (not from low-flow or embolic). Will check CBC, CMP, lactic acid. Call Vasc Surg. Likely admit for MRA.

## 2022-05-16 NOTE — ED PROVIDER NOTE - PHYSICAL EXAMINATION
Well appearing, well nourished, awake, alert, oriented to person, place, time/situation and in no apparent distress.    Airway patent    Eyes without scleral injection. No jaundice.    Strong pulse.    Respirations unlabored.    Abdomen soft, no guarding. TTP LLQ.    Spine appears normal, range of motion is not limited, no muscle or joint tenderness.    Alert and oriented, no gross motor or sensory deficits.    Skin normal color for race, warm, dry and intact. No evidence of rash.    No SI/HI.

## 2022-05-16 NOTE — H&P ADULT - PROBLEM SELECTOR PLAN 8
CTAP w/oral contrast showing Moderate subpleural reticulation with traction bronchiectasis, compatible with interstitial fibrosis. No honeycombing is identified.  Patient denies cough, shortness of breath.  Outpatient follow up. Continue alfuzosin 10 mg daily.  Monitor for retention.

## 2022-05-16 NOTE — H&P ADULT - NS ATTEND AMEND GEN_ALL_CORE FT
83 y/o Male with MHx of CAD (s/p 1 stent in 2016, on Aspirin and Plavix), Hypertension, hyperlipidemia, Diverticulosis, LBBB a/w lower abdominal pain due to acute colitis of unclear etiology vs mesenteric ischemia. Incidental finding of bronchiectasis and bilateral renal cysts; 81 y/o Male with MHx of CAD (s/p 1 stent in 2016, on Aspirin and Plavix), Hypertension, hyperlipidemia, Diverticulosis, LBBB a/w lower abdominal pain due to acute colitis of unclear etiology vs mesenteric ischemia. Incidental finding of bronchiectasis and bilateral renal cysts;    Assessment and plan supplemented and modified by attending where indicated;

## 2022-05-16 NOTE — H&P ADULT - PROBLEM SELECTOR PLAN 2
Patient on lisinopril and metoprolol succinate 50 mg ER.  Will continue with hold parameters. CTAP w/oral contrast showing moderate subpleural reticulation with traction bronchiectasis, compatible with interstitial fibrosis. No honeycombing is identified.  Patient denies cough, shortness of breath.  -Outpatient follow up with PCP/ pulmonology

## 2022-05-16 NOTE — ED PROVIDER NOTE - CLINICAL SUMMARY MEDICAL DECISION MAKING FREE TEXT BOX
Juliana: Concern for atherosclerotic mesenteric ischemia (not from low-flow or embolic). Will check CBC, CMP, lactic acid. Call Vasc Surg. Likely admit for MRA.

## 2022-05-16 NOTE — HISTORY OF PRESENT ILLNESS
[FreeTextEntry8] : 82 year old male with constipation and then vomiting episode, once\par \par but hasn’t had a BM for over a week and is still having nausea\par \par He had diverticulitis over 10 years ago. \par \par No fevers \par \par

## 2022-05-16 NOTE — H&P ADULT - NSHPLABSRESULTS_GEN_ALL_CORE
14.8   9.43  )-----------( 377      ( 16 May 2022 18:00 )             44.2     05-16    140  |  102  |  21  ----------------------------<  96  3.7   |  26  |  1.13    Ca    9.2      16 May 2022 18:00  Phos  3.3     05-16  Mg     1.90     05-16    TPro  7.2  /  Alb  4.6  /  TBili  0.7  /  DBili  x   /  AST  28  /  ALT  23  /  AlkPhos  72  05-16    PT/INR - ( 16 May 2022 18:00 )   PT: 12.4 sec;   INR: 1.07 ratio         PTT - ( 16 May 2022 18:00 )  PTT:29.1 sec    18:00 - VBG - pH: 7.32  | pCO2: 57    | pO2: 22    | Lactate: 1.5      CTAP w/oral contrast:  FINDINGS:  LOWER CHEST: Moderate subpleural reticulation with traction   bronchiectasis, compatible with interstitial fibrosis. No honeycombing is   identified. Small hiatal hernia. Coronary artery stent.    LIVER: Within normal limits.  BILE DUCTS: Normal caliber.  GALLBLADDER: Within normal limits.  SPLEEN: Within normal limits.  PANCREAS: Within normal limits.  ADRENALS: Within normal limits.  KIDNEYS/URETERS: Bilateral renal cysts measuring up to 6.1 cm arising   from the lateral midpole of the right kidney. Otherwise, within normal   limits.    BLADDER: Within normal limits.  REPRODUCTIVE ORGANS: The prostate is enlarged.    BOWEL: There is moderate stool throughout the colon. There is   diverticulosis of the sigmoid colon. There is mild concentric wall   thickening of the collapsed proximal and mid descending colon with mild   adjacent stranding. There is no evidence of pneumatosis or extraluminal   gas.    Remaining small and large bowel loops are unremarkable with no evidence   of obstruction, bowel wall thickening, or inflammatory stranding of the   adjacent mesenteric fat.  PERITONEUM: No ascites.  VESSELS: There is moderate atherosclerotic calcification of the abdominal   aorta, most prominent at the origins of the mesenteric arteries.  RETROPERITONEUM/LYMPH NODES: No lymphadenopathy.  ABDOMINAL WALL: Within normal limits.  BONES: Mild anterolisthesis of L4 on L5.    IMPRESSION: Segmental concentric wall thickening and adjacent stranding   of the proximal and mid descending colon. Given the anatomic distribution   and evidence of atherosclerotic disease, differential considerations   include watershed ischemic colitis. Recommend dedicated MRA of the   mesenteric arteries and vascular surgery consultation. Results discussed   with Dr. Yvonne Renae at time of interpretation. 14.8   9.43  )-----------( 377      ( 16 May 2022 18:00 )             44.2     05-16    140  |  102  |  21  ----------------------------<  96  3.7   |  26  |  1.13    Ca    9.2      16 May 2022 18:00  Phos  3.3     05-16  Mg     1.90     05-16    TPro  7.2  /  Alb  4.6  /  TBili  0.7  /  DBili  x   /  AST  28  /  ALT  23  /  AlkPhos  72  05-16    PT/INR - ( 16 May 2022 18:00 )   PT: 12.4 sec;   INR: 1.07 ratio         PTT - ( 16 May 2022 18:00 )  PTT:29.1 sec    18:00 - VBG - pH: 7.32  | pCO2: 57    | pO2: 22    | Lactate: 1.5      CTAP w/oral contrast:  FINDINGS:  LOWER CHEST: Moderate subpleural reticulation with traction   bronchiectasis, compatible with interstitial fibrosis. No honeycombing is   identified. Small hiatal hernia. Coronary artery stent.    LIVER: Within normal limits.  BILE DUCTS: Normal caliber.  GALLBLADDER: Within normal limits.  SPLEEN: Within normal limits.  PANCREAS: Within normal limits.  ADRENALS: Within normal limits.  KIDNEYS/URETERS: Bilateral renal cysts measuring up to 6.1 cm arising   from the lateral midpole of the right kidney. Otherwise, within normal   limits.    BLADDER: Within normal limits.  REPRODUCTIVE ORGANS: The prostate is enlarged.    BOWEL: There is moderate stool throughout the colon. There is   diverticulosis of the sigmoid colon. There is mild concentric wall   thickening of the collapsed proximal and mid descending colon with mild   adjacent stranding. There is no evidence of pneumatosis or extraluminal   gas.    Remaining small and large bowel loops are unremarkable with no evidence   of obstruction, bowel wall thickening, or inflammatory stranding of the   adjacent mesenteric fat.  PERITONEUM: No ascites.  VESSELS: There is moderate atherosclerotic calcification of the abdominal   aorta, most prominent at the origins of the mesenteric arteries.  RETROPERITONEUM/LYMPH NODES: No lymphadenopathy.  ABDOMINAL WALL: Within normal limits.  BONES: Mild anterolisthesis of L4 on L5.    IMPRESSION: Segmental concentric wall thickening and adjacent stranding   of the proximal and mid descending colon. Given the anatomic distribution   and evidence of atherosclerotic disease, differential considerations   include watershed ischemic colitis. Recommend dedicated MRA of the   mesenteric arteries and vascular surgery consultation. Results discussed   with Dr. Yvonne Renae at time of interpretation.    CTA Abdomen and pelvis w/IV contrast: Preliminary report.  Redemonstration of short segment mild wall thickening   and adjacent fat stranding of the proximal and mid descending colon with   normal enhancement. No portal venous gas or pneumatosis.  Celiac artery, SMA and MICHELLE are patent with atherosclerotic plaque noted   in the SMA and celiac ostia resulting in mild narrowing.  Other findings as discussed in prior CT from the same day.  Please f/u official report in am.    EKG: Pending 14.8   9.43  )-----------( 377      ( 16 May 2022 18:00 )             44.2     05-16    140  |  102  |  21  ----------------------------<  96  3.7   |  26  |  1.13    Ca    9.2      16 May 2022 18:00  Phos  3.3     05-16  Mg     1.90     05-16    TPro  7.2  /  Alb  4.6  /  TBili  0.7  /  DBili  x   /  AST  28  /  ALT  23  /  AlkPhos  72  05-16    PT/INR - ( 16 May 2022 18:00 )   PT: 12.4 sec;   INR: 1.07 ratio         PTT - ( 16 May 2022 18:00 )  PTT:29.1 sec    18:00 - VBG - pH: 7.32  | pCO2: 57    | pO2: 22    | Lactate: 1.5      CTAP w/oral contrast:  FINDINGS:  LOWER CHEST: Moderate subpleural reticulation with traction   bronchiectasis, compatible with interstitial fibrosis. No honeycombing is   identified. Small hiatal hernia. Coronary artery stent.    LIVER: Within normal limits.  BILE DUCTS: Normal caliber.  GALLBLADDER: Within normal limits.  SPLEEN: Within normal limits.  PANCREAS: Within normal limits.  ADRENALS: Within normal limits.  KIDNEYS/URETERS: Bilateral renal cysts measuring up to 6.1 cm arising   from the lateral midpole of the right kidney. Otherwise, within normal   limits.    BLADDER: Within normal limits.  REPRODUCTIVE ORGANS: The prostate is enlarged.    BOWEL: There is moderate stool throughout the colon. There is   diverticulosis of the sigmoid colon. There is mild concentric wall   thickening of the collapsed proximal and mid descending colon with mild   adjacent stranding. There is no evidence of pneumatosis or extraluminal   gas.    Remaining small and large bowel loops are unremarkable with no evidence   of obstruction, bowel wall thickening, or inflammatory stranding of the   adjacent mesenteric fat.  PERITONEUM: No ascites.  VESSELS: There is moderate atherosclerotic calcification of the abdominal   aorta, most prominent at the origins of the mesenteric arteries.  RETROPERITONEUM/LYMPH NODES: No lymphadenopathy.  ABDOMINAL WALL: Within normal limits.  BONES: Mild anterolisthesis of L4 on L5.    IMPRESSION: Segmental concentric wall thickening and adjacent stranding   of the proximal and mid descending colon. Given the anatomic distribution   and evidence of atherosclerotic disease, differential considerations   include watershed ischemic colitis. Recommend dedicated MRA of the   mesenteric arteries and vascular surgery consultation. Results discussed   with Dr. Yvonne Renae at time of interpretation.    CTA Abdomen and pelvis w/IV contrast: Preliminary report.  Redemonstration of short segment mild wall thickening   and adjacent fat stranding of the proximal and mid descending colon with   normal enhancement. No portal venous gas or pneumatosis.  Celiac artery, SMA and MICHELLE are patent with atherosclerotic plaque noted   in the SMA and celiac ostia resulting in mild narrowing.  Other findings as discussed in prior CT from the same day.  Please f/u official report in am.    EKG: Sinus rhythm at 69 bpm. QT/QTc 426/456. LBBB (not new per patient), TWI in lead aVL. -personally interpreted EKG NSR 69bpm, qtc 456, LBBB, no acute ST or Tw changes, no PACs or PVCs    -personally reviewed labs:    14.8   9.43  )-----------( 377      ( 16 May 2022 18:00 )             44.2     05-16    140  |  102  |  21  ----------------------------<  96  3.7   |  26  |  1.13    Ca    9.2      16 May 2022 18:00  Phos  3.3     05-16  Mg     1.90     05-16    TPro  7.2  /  Alb  4.6  /  TBili  0.7  /  DBili  x   /  AST  28  /  ALT  23  /  AlkPhos  72  05-16    PT/INR - ( 16 May 2022 18:00 )   PT: 12.4 sec;   INR: 1.07 ratio         PTT - ( 16 May 2022 18:00 )  PTT:29.1 sec    18:00 - VBG - pH: 7.32  | pCO2: 57    | pO2: 22    | Lactate: 1.5      -personally reviewed CT AP w/oral contrast:    FINDINGS:  LOWER CHEST: Moderate subpleural reticulation with traction   bronchiectasis, compatible with interstitial fibrosis. No honeycombing is   identified. Small hiatal hernia. Coronary artery stent.    LIVER: Within normal limits.  BILE DUCTS: Normal caliber.  GALLBLADDER: Within normal limits.  SPLEEN: Within normal limits.  PANCREAS: Within normal limits.  ADRENALS: Within normal limits.  KIDNEYS/URETERS: Bilateral renal cysts measuring up to 6.1 cm arising   from the lateral midpole of the right kidney. Otherwise, within normal   limits.    BLADDER: Within normal limits.  REPRODUCTIVE ORGANS: The prostate is enlarged.    BOWEL: There is moderate stool throughout the colon. There is   diverticulosis of the sigmoid colon. There is mild concentric wall   thickening of the collapsed proximal and mid descending colon with mild   adjacent stranding. There is no evidence of pneumatosis or extraluminal   gas.    Remaining small and large bowel loops are unremarkable with no evidence   of obstruction, bowel wall thickening, or inflammatory stranding of the   adjacent mesenteric fat.  PERITONEUM: No ascites.  VESSELS: There is moderate atherosclerotic calcification of the abdominal   aorta, most prominent at the origins of the mesenteric arteries.  RETROPERITONEUM/LYMPH NODES: No lymphadenopathy.  ABDOMINAL WALL: Within normal limits.  BONES: Mild anterolisthesis of L4 on L5.    IMPRESSION: Segmental concentric wall thickening and adjacent stranding   of the proximal and mid descending colon. Given the anatomic distribution   and evidence of atherosclerotic disease, differential considerations   include watershed ischemic colitis. Recommend dedicated MRA of the   mesenteric arteries and vascular surgery consultation. Results discussed   with Dr. Yvonne Renae at time of interpretation.    -personally reviewed CTA Abdomen and pelvis w/IV contrast:     Preliminary report.  Redemonstration of short segment mild wall thickening   and adjacent fat stranding of the proximal and mid descending colon with   normal enhancement. No portal venous gas or pneumatosis.  Celiac artery, SMA and MICHELLE are patent with atherosclerotic plaque noted   in the SMA and celiac ostia resulting in mild narrowing.  Other findings as discussed in prior CT from the same day.  Please f/u official report in am.

## 2022-05-16 NOTE — ED PROVIDER NOTE - OBJECTIVE STATEMENT
Juliana: CAD (stent 8 yrs ago, ASA, Plavix (despite stent placed 8 yrs ago). Pain in bottom of stomach x 1 wk. F/b vomiting 5 days ago, some nausea since then. No bleeding. Sent by for CT by REZA Renae. CT showed concern for mesenteric ischemia. Referred here for MRA and vasc surg consult. Not provoked by food. Is provoked by BMs. No F/chills. No  Sx. When pain comes, it lasts 1.5 hrs. Last colonoscopy 4 yrs ago (diverticulosis).

## 2022-05-16 NOTE — CONSULT NOTE ADULT - ASSESSMENT
83 y/o F with PMH of CAD (s/p 1 stent in 2016, on Aspirin and Plavix), HTN, HLD, diverticulosis presents to the ED complaining of lower abdominal pain. Outpatient CT w/ oral contrast showed atherosclerotic disease, particularly at origin of mesenteric vessels. Vascular surgery consult to evaluate for possible mesenteric ischemia.     - No acute vascular intervention   - Monitor abdominal exam   - Pain control prn   - Low clinical suspicion for mesenteric ischemia, however given hx CAD and question of significant atherosclerosis on outside imaging, recommend CTA of ab/pelvis for further evaluation  - Discussed with Dr. Cruz Crane PGY3  C Team Surgery   [id number]

## 2022-05-16 NOTE — H&P ADULT - PROBLEM SELECTOR PLAN 1
Admit to medicine, continue monitoring.  CTAP w/ oral contrast showing Segmental concentric wall thickening and adjacent stranding of the proximal and mid descending colon. Given the anatomic distribution and evidence of atherosclerotic disease, differential considerations include watershed ischemic colitis.  CTA abdomen and pelvis w/IV contrast preliminary report showing Redemonstration of short segment mild wall thickening and adjacent fat stranding of the proximal and mid descending colon with normal enhancement. No portal venous gas or pneumatosis. Celiac artery, SMA and MICHELLE are patent with atherosclerotic plaque noted in the SMA and celiac ostia resulting in mild narrowing. Other findings as discussed in prior CT from the same day.  Lipase 19.  GI consult emailed for possible colonoscopy.  Will recall surgery for further recommendations.  Consider Admit to medicine, continue monitoring.  CTAP w/ oral contrast showing Segmental concentric wall thickening and adjacent stranding of the proximal and mid descending colon. Given the anatomic distribution and evidence of atherosclerotic disease, differential considerations include watershed ischemic colitis.  CTA abdomen and pelvis w/IV contrast preliminary report showing Redemonstration of short segment mild wall thickening and adjacent fat stranding of the proximal and mid descending colon with normal enhancement. No portal venous gas or pneumatosis. Celiac artery, SMA and MICHELLE are patent with atherosclerotic plaque noted in the SMA and celiac ostia resulting in mild narrowing. Other findings as discussed in prior CT from the same day.  Lipase 19.  GI consult emailed for possible colonoscopy.  Will recall surgery for further recommendations.  Consider starting patient on IV antibiotics for ischemic colitis.  Consider starting patient on bowel regimen. Admit to medicine, continue monitoring.  CTAP w/ oral contrast showing Segmental concentric wall thickening and adjacent stranding of the proximal and mid descending colon. Given the anatomic distribution and evidence of atherosclerotic disease, differential considerations include watershed ischemic colitis.  CTA abdomen and pelvis w/IV contrast preliminary report showing Redemonstration of short segment mild wall thickening and adjacent fat stranding of the proximal and mid descending colon with normal enhancement. No portal venous gas or pneumatosis. Celiac artery, SMA and MICHELLE are patent with atherosclerotic plaque noted in the SMA and celiac ostia resulting in mild narrowing. Other findings as discussed in prior CT from the same day.  Lipase 19.  GI consult emailed for possible colonoscopy.  Discussed findings with Dr. Crane from Surgery, no further recommendations besides GI consult.  Consider starting patient on bowel regimen. Admit to medicine, continue monitoring.  CTAP w/ oral contrast showing Segmental concentric wall thickening and adjacent stranding of the proximal and mid descending colon. Given the anatomic distribution and evidence of atherosclerotic disease, differential considerations include watershed ischemic colitis.  CTA abdomen and pelvis w/IV contrast preliminary report showing Redemonstration of short segment mild wall thickening and adjacent fat stranding of the proximal and mid descending colon with normal enhancement. No portal venous gas or pneumatosis. Celiac artery, SMA and MICHELLE are patent with atherosclerotic plaque noted in the SMA and celiac ostia resulting in mild narrowing. Other findings as discussed in prior CT from the same day.  Lipase 19.  GI consult emailed for possible colonoscopy.  Discussed findings with Dr. Crane from Surgery, no further recommendations besides GI consult.  ESR and CRP ordered. Admit to medicine, continue monitoring.  CTAP w/ oral contrast showing Segmental concentric wall thickening and adjacent stranding of the proximal and mid descending colon. Given the anatomic distribution and evidence of atherosclerotic disease, differential considerations include watershed ischemic colitis.  CTA abdomen and pelvis w/IV contrast preliminary report showing Redemonstration of short segment mild wall thickening and adjacent fat stranding of the proximal and mid descending colon with normal enhancement. No portal venous gas or pneumatosis. Celiac artery, SMA and MICHELLE are patent with atherosclerotic plaque noted in the SMA and celiac ostia resulting in mild narrowing. Other findings as discussed in prior CT from the same day.  Lipase 19.  GI consult emailed for possible colonoscopy.  Discussed findings with Dr. Crane from Surgery, no further recommendations besides GI consult.  ESR and CRP ordered.  Clear liquid diet ordered. CTAP w/ oral contrast showing Segmental concentric wall thickening and adjacent stranding of the proximal and mid descending colon. Given the anatomic distribution and evidence of atherosclerotic disease, differential considerations include watershed ischemic colitis.  CTA abdomen and pelvis w/IV contrast preliminary report: short segment mild wall thickening and adjacent fat stranding of the proximal and mid descending colon   -primary team to f/u final report   Lipase 19.  Requested formal GI consult for colonoscopy  Discussed findings with Dr. Crane from Surgery, no further recommendations besides GI consult.  ESR and CRP ordered.  Clear liquid diet ordered.  Pain control PRN

## 2022-05-16 NOTE — ED ADULT TRIAGE NOTE - CHIEF COMPLAINT QUOTE
Pt c/o abdominal pain, nausea and abnormal BM x 4 days and had a CT showing "a blockage" today. PMH htn, enlarged prostate

## 2022-05-16 NOTE — H&P ADULT - PROBLEM SELECTOR PLAN 7
CTAP w/oral contrast incidentally showing Bilateral renal cysts measuring up to 6.1 cm arising from the lateral midpole of the right kidney. Otherwise, within normal limits.  Outpatient follow up. Lipid level in AM.  Continue rosuvastatin 40 mg.

## 2022-05-16 NOTE — H&P ADULT - PROBLEM SELECTOR PLAN 4
Lipid level in AM.  Continue rosuvastatin 40 mg. No CP or palpitations; No NASH;  Continue Aspirin, Plavix and metoprolol  Screening EKG: no acute changes

## 2022-05-16 NOTE — CONSULT NOTE ADULT - SUBJECTIVE AND OBJECTIVE BOX
HPI:   81 y/o F with PMH of CAD (s/p 1 stent in 2016, on Aspirin and Plavix), HTN, HLD, diverticulosis presents to the ED complaining of lower abdominal pain. Pt states pain started 1 week ago and has recurred daily, mostly in the evenings. He states pain is cramping and feels like he is going to have a BM, however his last BM was 1 week ago. He endorses flatus. Pt states pain not post prandial and has not noted a specific trigger. Pt reports 1 episode of NBNB vomiting after onset of pain. After that he states he has had mild nausea intermittently but no emesis and is tolerating a diet. Pt went to his PCP today who ordered CT scan which he he reports was concerning for ischemic colitis and/or mesenteric ischemia given distribution of atherosclerotic disease. Patient reports normal colonoscopy 4 years ago. H denies fever, chills, chest pain, shortness of breath, diarrhea, urinary symptoms, recent illness or sick contacts.     In ED pt afebrile, HD stable. Labs with no leukocytosis and normal lactate; otherwise unremarkable. Outpatient CT w/ oral contrast showed atherosclerotic disease, particularly at origin of mesenteric vessels. Also, noted segmental concentric wall thickening and adjacent stranding of the proximal and mid descending colon. Surgery consulted for r/o mesenteric ischemia and/or ischemic colitis.     PAST MEDICAL & SURGICAL HISTORY:  CAD (coronary artery disease)  1 stent, placed in 2016      Hyperlipidemia      Gout      BPH (benign prostatic hyperplasia)      History of hand surgery      Stented coronary artery          ROS: Negative except for HPI    MEDICATIONS:  aspirin enteric coated 81 milliGRAM(s) Oral daily  clopidogrel Tablet 75 milliGRAM(s) Oral daily  enoxaparin Injectable 40 milliGRAM(s) SubCutaneous every 24 hours      lisinopril 5 milliGRAM(s) Oral daily  metoprolol succinate ER 50 milliGRAM(s) Oral daily  tamsulosin 0.8 milliGRAM(s) Oral at bedtime      allopurinol 100 milliGRAM(s) Oral daily  atorvastatin 80 milliGRAM(s) Oral at bedtime  sodium chloride 0.9% lock flush 3 milliLiter(s) IV Push every 8 hours      Allergies    No Known Allergies    Intolerances        SOCIAL HISTORY: Denies tobacco, social ETOH, denies illicit drug use    FAMILY HISTORY:  FH: CAD (coronary artery disease)        Vital Signs Last 24 Hrs  T(C): 36.4 (17 May 2022 01:32), Max: 36.7 (16 May 2022 16:46)  T(F): 97.5 (17 May 2022 01:32), Max: 98.1 (16 May 2022 16:46)  HR: 71 (17 May 2022 01:32) (64 - 79)  BP: 163/92 (17 May 2022 01:32) (121/85 - 220/124)  BP(mean): --  RR: 18 (17 May 2022 01:32) (17 - 18)  SpO2: 99% (17 May 2022 01:32) (96% - 100%)    PHYSICAL EXAM:    Constitutional: NAD  HEENT: PERRLA, EOMI  Neck: No JVD  Respiratory: Unlabored   Cardiovascular: RRR  Gastrointestinal: soft  Extremities: No peripheral edema  Vascular: 2+ peripheral pulses  Neurological: A/O x 3  Skin: No rashes    LABS:                        14.8   9.43  )-----------( 377      ( 16 May 2022 18:00 )             44.2     05-16    140  |  102  |  21  ----------------------------<  96  3.7   |  26  |  1.13    Ca    9.2      16 May 2022 18:00  Phos  3.3     05-16  Mg     1.90     05-16    TPro  7.2  /  Alb  4.6  /  TBili  0.7  /  DBili  x   /  AST  28  /  ALT  23  /  AlkPhos  72  05-16    PT/INR - ( 16 May 2022 18:00 )   PT: 12.4 sec;   INR: 1.07 ratio         PTT - ( 16 May 2022 18:00 )  PTT:29.1 sec

## 2022-05-17 DIAGNOSIS — K59.00 CONSTIPATION, UNSPECIFIED: ICD-10-CM

## 2022-05-17 LAB
A1C WITH ESTIMATED AVERAGE GLUCOSE RESULT: 5.8 % — HIGH (ref 4–5.6)
ANION GAP SERPL CALC-SCNC: 11 MMOL/L — SIGNIFICANT CHANGE UP (ref 7–14)
BASOPHILS # BLD AUTO: 0.06 K/UL
BASOPHILS # BLD AUTO: 0.06 K/UL — SIGNIFICANT CHANGE UP (ref 0–0.2)
BASOPHILS NFR BLD AUTO: 0.6 %
BASOPHILS NFR BLD AUTO: 0.8 % — SIGNIFICANT CHANGE UP (ref 0–2)
BUN SERPL-MCNC: 16 MG/DL — SIGNIFICANT CHANGE UP (ref 7–23)
CALCIUM SERPL-MCNC: 9.4 MG/DL — SIGNIFICANT CHANGE UP (ref 8.4–10.5)
CHLORIDE SERPL-SCNC: 102 MMOL/L — SIGNIFICANT CHANGE UP (ref 98–107)
CHOLEST SERPL-MCNC: 100 MG/DL — SIGNIFICANT CHANGE UP
CO2 SERPL-SCNC: 26 MMOL/L — SIGNIFICANT CHANGE UP (ref 22–31)
CREAT SERPL-MCNC: 1.05 MG/DL — SIGNIFICANT CHANGE UP (ref 0.5–1.3)
CRP SERPL-MCNC: <4 MG/L — SIGNIFICANT CHANGE UP
EGFR: 70 ML/MIN/1.73M2 — SIGNIFICANT CHANGE UP
EOSINOPHIL # BLD AUTO: 0.41 K/UL
EOSINOPHIL # BLD AUTO: 0.61 K/UL — HIGH (ref 0–0.5)
EOSINOPHIL NFR BLD AUTO: 4 %
EOSINOPHIL NFR BLD AUTO: 7.9 % — HIGH (ref 0–6)
ERYTHROCYTE [SEDIMENTATION RATE] IN BLOOD: 6 MM/HR — SIGNIFICANT CHANGE UP (ref 1–15)
ESTIMATED AVERAGE GLUCOSE: 120 — SIGNIFICANT CHANGE UP
GLUCOSE SERPL-MCNC: 87 MG/DL — SIGNIFICANT CHANGE UP (ref 70–99)
HCT VFR BLD CALC: 43.4 % — SIGNIFICANT CHANGE UP (ref 39–50)
HCT VFR BLD CALC: 43.5 %
HDLC SERPL-MCNC: 49 MG/DL — SIGNIFICANT CHANGE UP
HGB BLD-MCNC: 13.9 G/DL
HGB BLD-MCNC: 14.4 G/DL — SIGNIFICANT CHANGE UP (ref 13–17)
IANC: 4.75 K/UL — SIGNIFICANT CHANGE UP (ref 1.8–7.4)
IMM GRANULOCYTES NFR BLD AUTO: 0.2 %
IMM GRANULOCYTES NFR BLD AUTO: 0.3 % — SIGNIFICANT CHANGE UP (ref 0–1.5)
LIPID PNL WITH DIRECT LDL SERPL: 38 MG/DL — SIGNIFICANT CHANGE UP
LYMPHOCYTES # BLD AUTO: 1.63 K/UL — SIGNIFICANT CHANGE UP (ref 1–3.3)
LYMPHOCYTES # BLD AUTO: 1.67 K/UL
LYMPHOCYTES # BLD AUTO: 21 % — SIGNIFICANT CHANGE UP (ref 13–44)
LYMPHOCYTES NFR BLD AUTO: 16.2 %
MAGNESIUM SERPL-MCNC: 1.9 MG/DL — SIGNIFICANT CHANGE UP (ref 1.6–2.6)
MAN DIFF?: NORMAL
MCHC RBC-ENTMCNC: 29.8 PG
MCHC RBC-ENTMCNC: 30.1 PG — SIGNIFICANT CHANGE UP (ref 27–34)
MCHC RBC-ENTMCNC: 32 GM/DL
MCHC RBC-ENTMCNC: 33.2 GM/DL — SIGNIFICANT CHANGE UP (ref 32–36)
MCV RBC AUTO: 90.8 FL — SIGNIFICANT CHANGE UP (ref 80–100)
MCV RBC AUTO: 93.3 FL
MONOCYTES # BLD AUTO: 0.69 K/UL — SIGNIFICANT CHANGE UP (ref 0–0.9)
MONOCYTES # BLD AUTO: 0.81 K/UL
MONOCYTES NFR BLD AUTO: 7.9 %
MONOCYTES NFR BLD AUTO: 8.9 % — SIGNIFICANT CHANGE UP (ref 2–14)
NEUTROPHILS # BLD AUTO: 4.75 K/UL — SIGNIFICANT CHANGE UP (ref 1.8–7.4)
NEUTROPHILS # BLD AUTO: 7.32 K/UL
NEUTROPHILS NFR BLD AUTO: 61.1 % — SIGNIFICANT CHANGE UP (ref 43–77)
NEUTROPHILS NFR BLD AUTO: 71.1 %
NON HDL CHOLESTEROL: 51 MG/DL — SIGNIFICANT CHANGE UP
NRBC # BLD: 0 /100 WBCS — SIGNIFICANT CHANGE UP
NRBC # FLD: 0 K/UL — SIGNIFICANT CHANGE UP
PHOSPHATE SERPL-MCNC: 3.2 MG/DL — SIGNIFICANT CHANGE UP (ref 2.5–4.5)
PLATELET # BLD AUTO: 344 K/UL — SIGNIFICANT CHANGE UP (ref 150–400)
PLATELET # BLD AUTO: 371 K/UL
POTASSIUM SERPL-MCNC: 4.6 MMOL/L — SIGNIFICANT CHANGE UP (ref 3.5–5.3)
POTASSIUM SERPL-SCNC: 4.6 MMOL/L — SIGNIFICANT CHANGE UP (ref 3.5–5.3)
RBC # BLD: 4.66 M/UL
RBC # BLD: 4.78 M/UL — SIGNIFICANT CHANGE UP (ref 4.2–5.8)
RBC # FLD: 12.3 % — SIGNIFICANT CHANGE UP (ref 10.3–14.5)
RBC # FLD: 12.6 %
SODIUM SERPL-SCNC: 139 MMOL/L — SIGNIFICANT CHANGE UP (ref 135–145)
TRIGL SERPL-MCNC: 64 MG/DL — SIGNIFICANT CHANGE UP
TSH SERPL-MCNC: 4.59 UIU/ML — HIGH (ref 0.27–4.2)
WBC # BLD: 7.76 K/UL — SIGNIFICANT CHANGE UP (ref 3.8–10.5)
WBC # FLD AUTO: 10.29 K/UL
WBC # FLD AUTO: 7.76 K/UL — SIGNIFICANT CHANGE UP (ref 3.8–10.5)

## 2022-05-17 PROCEDURE — 99223 1ST HOSP IP/OBS HIGH 75: CPT

## 2022-05-17 PROCEDURE — 99233 SBSQ HOSP IP/OBS HIGH 50: CPT

## 2022-05-17 PROCEDURE — 99222 1ST HOSP IP/OBS MODERATE 55: CPT | Mod: GC

## 2022-05-17 RX ORDER — POLYETHYLENE GLYCOL 3350 17 G/17G
17 POWDER, FOR SOLUTION ORAL DAILY
Refills: 0 | Status: DISCONTINUED | OUTPATIENT
Start: 2022-05-17 | End: 2022-05-19

## 2022-05-17 RX ORDER — SENNA PLUS 8.6 MG/1
2 TABLET ORAL AT BEDTIME
Refills: 0 | Status: DISCONTINUED | OUTPATIENT
Start: 2022-05-17 | End: 2022-05-19

## 2022-05-17 RX ADMIN — ATORVASTATIN CALCIUM 80 MILLIGRAM(S): 80 TABLET, FILM COATED ORAL at 22:27

## 2022-05-17 RX ADMIN — SODIUM CHLORIDE 3 MILLILITER(S): 9 INJECTION INTRAMUSCULAR; INTRAVENOUS; SUBCUTANEOUS at 22:46

## 2022-05-17 RX ADMIN — SODIUM CHLORIDE 3 MILLILITER(S): 9 INJECTION INTRAMUSCULAR; INTRAVENOUS; SUBCUTANEOUS at 14:17

## 2022-05-17 RX ADMIN — ENOXAPARIN SODIUM 40 MILLIGRAM(S): 100 INJECTION SUBCUTANEOUS at 05:04

## 2022-05-17 RX ADMIN — POLYETHYLENE GLYCOL 3350 17 GRAM(S): 17 POWDER, FOR SOLUTION ORAL at 12:40

## 2022-05-17 RX ADMIN — Medication 100 MILLIGRAM(S): at 12:40

## 2022-05-17 RX ADMIN — TAMSULOSIN HYDROCHLORIDE 0.8 MILLIGRAM(S): 0.4 CAPSULE ORAL at 22:27

## 2022-05-17 RX ADMIN — CLOPIDOGREL BISULFATE 75 MILLIGRAM(S): 75 TABLET, FILM COATED ORAL at 12:40

## 2022-05-17 RX ADMIN — Medication 50 MILLIGRAM(S): at 05:04

## 2022-05-17 RX ADMIN — LISINOPRIL 5 MILLIGRAM(S): 2.5 TABLET ORAL at 05:04

## 2022-05-17 RX ADMIN — SENNA PLUS 2 TABLET(S): 8.6 TABLET ORAL at 22:27

## 2022-05-17 RX ADMIN — Medication 81 MILLIGRAM(S): at 12:40

## 2022-05-17 RX ADMIN — SODIUM CHLORIDE 3 MILLILITER(S): 9 INJECTION INTRAMUSCULAR; INTRAVENOUS; SUBCUTANEOUS at 05:09

## 2022-05-17 NOTE — CONSULT NOTE ADULT - ATTENDING COMMENTS
CTA results noted.  No additional vascular surgery workup intervention needed.  Cont aspirin, plavix, statin.  Vascular surgery will sign off. Reconsult as needed.
#Abdominal pain  #CT with descending colon wall thickening    Given distribution, suspect likely colonic ischemia. However, endoscopic evaluation is reasonable to rule out alternate etiologies. Discussed flex sig vs colonoscopy with patient, but he is currently undecided. If patient amenable, can try to accommodate for flex sig on tomorrow's schedule to assess left colon abnormalities. Further recommendations as above.

## 2022-05-17 NOTE — PROGRESS NOTE ADULT - ASSESSMENT
81 y/o Male with MHx of CAD (s/p 1 stent in 2016, on Aspirin and Plavix), Hypertension, hyperlipidemia, Diverticulosis, LBBB a/w lower abdominal pain due to acute colitis of unclear etiology vs mesenteric ischemia. Incidental finding of bronchiectasis and bilateral renal cysts;

## 2022-05-17 NOTE — PATIENT PROFILE ADULT - FALL HARM RISK - HARM RISK INTERVENTIONS

## 2022-05-17 NOTE — CONSULT NOTE ADULT - ASSESSMENT
83 y/o man with CAD (s/p ALEXIS in 2016, on DAPT), HTN, hyperlipidemia and diverticulosis presents to the ED for one week duration of lower abdominal pain with abnormal CT showing segmental bowel wall thickening and evidence of 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    # Bowel wall thickening in descending colon - No associated diarrhea, fever, leukocytosis or lactic acidosis. Suspicious for ischemic colitis given evidence of vascular diease on CTA A/P. No preceding diarrhea to suggest infectious etiology.   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   # CAD on DAPT  # Hyperlipidemia    Recommendations:  - awaiting stool tests (GI PCR, C. diff Ag) to rule out GI infection  - will need colonoscopy to evaluate bowel wall thickening in descending colon which can be performed as inpatient or outpatient procedure as per patient preference - not urgent given absence of blood with stools  - no IV antibiotics indicated at this time  - clear liquid diet for now until timing of colonoscopy determined      Coral Guevara PGY-6  Gastroenterology/Hepatology Fellow  Page #[phone]   Page #[phone] 5pm-7am on weekdays, and on weekends   83 y/o man with CAD (s/p ALEXIS in 2016, on DAPT), HTN, hyperlipidemia and diverticulosis presents to the ED for one week duration of lower abdominal pain with abnormal CT showing segmental bowel wall thickening and evidence of 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    # Bowel wall thickening in descending colon - No associated diarrhea, fever, leukocytosis or lactic acidosis. Suspicious for ischemic colitis given evidence of vascular disease and distribution on CTA A/P. No preceding diarrhea to suggest infectious or chronic inflammatory etiology. Less likely malignancy given CT findings.  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   # CAD on DAPT  # Hyperlipidemia    Recommendations:  - awaiting stool tests (GI PCR, C. diff Ag) to rule out GI infection  - offered endoscopic evaluation to bowel wall thickening in descending colon to confirm ischemic etiology and rule out alternate causes. Flex sig vs colonoscopy can be performed as inpatient or outpatient procedure if preferred by patient, but he is currently undecided  - Overall low evidence for antibiotics, but given persistent symptoms, not unreasonable to given short course of empiric broad-spectrum antibiotics  - Appreciate vascular/surgery input  - clear liquid diet for now until timing of colonoscopy determined      Coral Guevara PGY-6  Gastroenterology/Hepatology Fellow  Page #[phone]   Page #[phone] 5pm-7am on weekdays, and on weekends

## 2022-05-17 NOTE — PROGRESS NOTE ADULT - ASSESSMENT
81yo F with Hx CAD (s/p 1 stent 106, on ASA/Plavix), HTN, HLD, diverticulosis who p/w lower abdominal pain and OP CT demonstrating atherosclerotic disease c/f mesenteric ischemia vs. ischemic colitis.     PLAN:  - No acute vascular surgery intervention   - F/u GI c/s for c-scope to r/o ischemic colitis  - Please recall vascular surgery with additional questions       Yen Chang, PGY-2  C Team Surgery  #[id number],  81yo F with Hx CAD (s/p 1 stent 106, on ASA/Plavix), HTN, HLD, diverticulosis who p/w lower abdominal pain and OP CT demonstrating atherosclerotic disease c/f mesenteric ischemia vs. ischemic colitis.     PLAN:  - No acute vascular surgery intervention   - Please recall vascular surgery with additional questions       Yen Chang, PGY-2  C Team Surgery  #[id number],

## 2022-05-17 NOTE — PROGRESS NOTE ADULT - ASSESSMENT
81 y/o F with PMH of CAD (s/p 1 stent in 2016, on Aspirin and Plavix), HTN, HLD, diverticulosis presents to the ED complaining of lower abdominal pain. Outpatient CT w/ oral contrast showed atherosclerotic disease, particularly at origin of mesenteric vessels.     - No acute surgical intervention   - Low clinical suspicion for mesenteric ischemia  - GI consult for possible colonoscopy w/u colitis   - CTA final read - celiac artery, SMA, and MICHELLE are patent with mild narrowing  - Monitor abdominal exam   - Discussed with Dr. Pinto PGY3  B Team Surgery   [id number] 83 y/o F with PMH of CAD (s/p 1 stent in 2016, on Aspirin and Plavix), HTN, HLD, diverticulosis presents to the ED complaining of lower abdominal pain. Outpatient CT w/ oral contrast showed atherosclerotic disease, particularly at origin of mesenteric vessels.     - No acute surgical intervention   - CTA final read - celiac artery, SMA, and MICHELLE are patent with mild narrowing  - GI consult for possible colonoscopy w/u colitis   - Please call back with questions    B Team Surgery   [id number]

## 2022-05-17 NOTE — PROGRESS NOTE ADULT - SUBJECTIVE AND OBJECTIVE BOX
Subjective:  Patient seen at bedside this AM. Reports feeling well, without complaints. Denies chest pain, SOB. Some crampy abdominal pain after drinking water.    24h Events:   - Overnight, no acute events    Objective:  Vital Signs  T(C): 36.3 (05-17 @ 05:00), Max: 36.7 (05-16 @ 16:46)  HR: 67 (05-17 @ 05:00) (64 - 79)  BP: 143/62 (05-17 @ 05:00) (121/85 - 220/124)  RR: 18 (05-17 @ 05:00) (17 - 18)  SpO2: 97% (05-17 @ 05:00) (96% - 100%)    Physical Exam:  GEN: resting in bed comfortably in NAD  NEURO: awake, alert  CV: warm, well-perfused  RESP: no increased WOB  ABD: soft, non-distended, mildly tender to palpation in BLQ without rebound tenderness or guarding  EXTR: no gross deformities; spontaneous movement in b/l U/L extrem     Labs:             14.4   7.76  )-----------( 344      ( 17 May 2022 06:12 )             43.4     139  |  102  |  16  ----------------------------<  87  4.6   |  26  |  1.05    Ca    9.4      17 May 2022 06:12  Phos  3.2     05-17  Mg     1.90     05-17    TPro  7.2  /  Alb  4.6  /  TBili  0.7  /  DBili  x   /  AST  28  /  ALT  23  /  AlkPhos  72  05-16      Medications:   MEDICATIONS  (STANDING):  allopurinol 100 milliGRAM(s) Oral daily  aspirin enteric coated 81 milliGRAM(s) Oral daily  atorvastatin 80 milliGRAM(s) Oral at bedtime  clopidogrel Tablet 75 milliGRAM(s) Oral daily  enoxaparin Injectable 40 milliGRAM(s) SubCutaneous every 24 hours  lisinopril 5 milliGRAM(s) Oral daily  metoprolol succinate ER 50 milliGRAM(s) Oral daily  sodium chloride 0.9% lock flush 3 milliLiter(s) IV Push every 8 hours  tamsulosin 0.8 milliGRAM(s) Oral at bedtime    MEDICATIONS  (PRN):      Imaging:  < from: CT Angio Abdomen and Pelvis w/ IV Cont (05.16.22 @ 20:37) >  INTERPRETATION:  Redemonstration of short segment mild wall thickening   and adjacent fat stranding of the proximal and mid descending colon with   normal enhancement. No portal venous gas or pneumatosis.  Celiac artery, SMA and MICHELLE are patent with atherosclerotic plaque noted   in the SMA and celiac ostia resulting in mild narrowing.  Other findings as discussed in prior CT from the same day.  Please f/u official report in am.    < end of copied text >

## 2022-05-17 NOTE — PROGRESS NOTE ADULT - SUBJECTIVE AND OBJECTIVE BOX
[facility] Division of Hospital Medicine  Sangeeta Houston MD  Pager [phone]    Patient is a 82y old  Male who presents with a chief complaint of Abdominal pain       SUBJECTIVE / OVERNIGHT EVENTS: pt reports intermittent crampy lower abd pain; no BM x 1 week; this is the first time this has happened; appetite good, no vomiting, + flatus      MEDICATIONS  (STANDING):  allopurinol 100 milliGRAM(s) Oral daily  aspirin enteric coated 81 milliGRAM(s) Oral daily  atorvastatin 80 milliGRAM(s) Oral at bedtime  clopidogrel Tablet 75 milliGRAM(s) Oral daily  enoxaparin Injectable 40 milliGRAM(s) SubCutaneous every 24 hours  lisinopril 5 milliGRAM(s) Oral daily  metoprolol succinate ER 50 milliGRAM(s) Oral daily  polyethylene glycol 3350 17 Gram(s) Oral daily  senna 2 Tablet(s) Oral at bedtime  sodium chloride 0.9% lock flush 3 milliLiter(s) IV Push every 8 hours  tamsulosin 0.8 milliGRAM(s) Oral at bedtime      PHYSICAL EXAM:  Vital Signs Last 24 Hrs  T(F): 97.5 (17 May 2022 09:00), Max: 98.1 (16 May 2022 16:46)  HR: 66 (17 May 2022 09:00) (64 - 79)  BP: 153/60 (17 May 2022 09:00) (121/85 - 220/124)  RR: 18 (17 May 2022 09:00) (17 - 18)  SpO2: 100% (17 May 2022 09:00) (96% - 100%)    CONSTITUTIONAL: NAD, appears comfortable  EYES: PERRLA; conjunctiva and sclera clear  ENMT: Moist oral mucosa; normal dentition  RESPIRATORY: Normal respiratory effort; lungs are clear to auscultation bilaterally  CARDIOVASCULAR: Regular rate and rhythm; No lower extremity edema  ABDOMEN: Nontender to palpation, normoactive bowel sounds, soft  MUSCULOSKELETAL:  no clubbing or cyanosis of digits; no joint swelling or tenderness to palpation  PSYCH: A+O to person, place, and time; affect appropriate  NEUROLOGY: CN 2-12 are intact and symmetric; no gross sensory deficits   SKIN: No rashes; no palpable lesions    LABS:                        14.4   7.76  )-----------( 344      ( 17 May 2022 06:12 )             43.4     05-17    139  |  102  |  16  ----------------------------<  87  4.6   |  26  |  1.05    Ca    9.4      17 May 2022 06:12  Phos  3.2     05-17  Mg     1.90     05-17

## 2022-05-17 NOTE — PROGRESS NOTE ADULT - SUBJECTIVE AND OBJECTIVE BOX
SURGERY  Pager: [phone]    INTERVAL EVENTS/SUBJECTIVE: No acute events overnight.     ______________________________________________  OBJECTIVE:   T(C): 36.3 (05-17-22 @ 05:00), Max: 36.7 (05-16-22 @ 16:46)  HR: 67 (05-17-22 @ 05:00) (64 - 79)  BP: 143/62 (05-17-22 @ 05:00) (121/85 - 220/124)  RR: 18 (05-17-22 @ 05:00) (17 - 18)  SpO2: 97% (05-17-22 @ 05:00) (96% - 100%)  Wt(kg): --  CAPILLARY BLOOD GLUCOSE        I&O's Detail      Physical exam:  Constitutional: NAD  HEENT: PERRLA, EOMI  Neck: No JVD  Respiratory: Unlabored   Cardiovascular: RRR  Gastrointestinal: soft  Extremities: No peripheral edema  Vascular: 2+ peripheral pulses  Neurological: A/O x 3  Skin: No rashes    ______________________________________________  LABS:  CBC Full  -  ( 16 May 2022 18:00 )  WBC Count : 9.43 K/uL  RBC Count : 4.86 M/uL  Hemoglobin : 14.8 g/dL  Hematocrit : 44.2 %  Platelet Count - Automated : 377 K/uL  Mean Cell Volume : 90.9 fL  Mean Cell Hemoglobin : 30.5 pg  Mean Cell Hemoglobin Concentration : 33.5 gm/dL  Auto Neutrophil # : 6.28 K/uL  Auto Lymphocyte # : 1.87 K/uL  Auto Monocyte # : 0.75 K/uL  Auto Eosinophil # : 0.44 K/uL  Auto Basophil # : 0.06 K/uL  Auto Neutrophil % : 66.6 %  Auto Lymphocyte % : 19.8 %  Auto Monocyte % : 8.0 %  Auto Eosinophil % : 4.7 %  Auto Basophil % : 0.6 %    05-16    140  |  102  |  21  ----------------------------<  96  3.7   |  26  |  1.13    Ca    9.2      16 May 2022 18:00  Phos  3.3     05-16  Mg     1.90     05-16    TPro  7.2  /  Alb  4.6  /  TBili  0.7  /  DBili  x   /  AST  28  /  ALT  23  /  AlkPhos  72  05-16    _____________________________________________  RADIOLOGY:

## 2022-05-17 NOTE — PATIENT PROFILE ADULT - FUNCTIONAL ASSESSMENT - DAILY ACTIVITY 4.
3/18/2019         RE: Sea Solis  [address]  [place] [address]        Dear Colleague,    Thank you for referring your patient, Sea Solis, to the [facility]. Please see a copy of my visit note below.    HPI:  Sea Solis is a 37 year old male patient here today for kenalog injection into keloid scarring of chest .  Patient states this has been present for awhile.  Patient reports the following symptoms: pain .  Patient reports the following previous treatments: Kenalog 40 with great improvement. Six injected at LOV. Two have improved.  Patient reports the following modifying factors: none.  Associated symptoms: none.  Patient has no other skin complaints today.  Remainder of the HPI, Meds, PMH, Allergies, FH, and SH was reviewed in chart.    Pertinent Hx:   keloids  Past Medical History:   Diagnosis Date     Hyperlipidemia 3/21/2018       No past surgical history on file.     No family history on file.    Social History     Socioeconomic History     Marital status:      Spouse name: Not on file     Number of children: Not on file     Years of education: Not on file     Highest education level: Not on file   Occupational History     Not on file   Social Needs     Financial resource strain: Not on file     Food insecurity:     Worry: Not on file     Inability: Not on file     Transportation needs:     Medical: Not on file     Non-medical: Not on file   Tobacco Use     Smoking status: Former Smoker     Smokeless tobacco: Never Used   Substance and Sexual Activity     Alcohol use: Yes     Alcohol/week: 0.0 oz     Comment: 2-3x week     Drug use: Yes     Types: Marijuana     Comment: Occasional     Sexual activity: Yes     Partners: Female   Lifestyle     Physical activity:     Days per week: Not on file     Minutes per session: Not on file     Stress: Not on file   Relationships     Social connections:     Talks on phone: Not on file      Gets together: Not on file     Attends Shinto service: Not on file     Active member of club or organization: Not on file     Attends meetings of clubs or organizations: Not on file     Relationship status: Not on file     Intimate partner violence:     Fear of current or ex partner: Not on file     Emotionally abused: Not on file     Physically abused: Not on file     Forced sexual activity: Not on file   Other Topics Concern     Not on file   Social History Narrative     Not on file       Outpatient Encounter Medications as of 3/18/2019   Medication Sig Dispense Refill     ibuprofen (ADVIL/MOTRIN) 800 MG tablet Take 1 tablet (800 mg) by mouth every 8 hours as needed for moderate pain (Patient not taking: Reported on 3/18/2019) 60 tablet 1     sildenafil (VIAGRA) 50 MG tablet Take 1 tablet (50 mg) by mouth daily as needed 30 min to 4 hrs before sex. Do not use with nitroglycerin, terazosin or doxazosin. (Patient not taking: Reported on 3/18/2019) 6 tablet 1     No facility-administered encounter medications on file as of 3/18/2019.        Review Of Systems:  Skin: As above  Eyes: negative  Ears/Nose/Throat: negative  Respiratory: No shortness of breath, dyspnea on exertion, cough, or hemoptysis  Cardiovascular: negative  Gastrointestinal: negative  Genitourinary: negative  Musculoskeletal: negative  Neurologic: negative  Psychiatric: negative  Hematologic/Lymphatic/Immunologic: negative  Endocrine: negative      Objective:     /64   Pulse 71   SpO2 99%   Eyes: Conjunctivae/lids: Normal   ENT: Lips:  Normal  MSK: Normal  Cardiovascular: Peripheral edema none  Pulm: Breathing Normal  Neuro/Psych: Orientation: Normal; Mood/Affect: Normal, NAD, WDWN  Pt accompanied by: self  Following areas examined: face, neck, chest, abdomen  Villafana skin type:v   Findings:  1) 4 firm wd smooth papules on mid chest and left chest. 4 atropic smooth macule son chest  Assessment and Plan:  1) keloids x 4  IL TMC: PGACAC  discussed.  Risks including but not limited to injection site reaction, bruising, no resolution.  All questions answered and entertained to patient s satisfaction.  Informed consent obtained.  IL TAC in concentration of 40mg/ml was injected ID to 4 lesion.  Total injected was  0.1 ml.  Patient tolerated without complications and given wound care instructions, including not to move product around.  Return in 4 weeks for follow-up and possible additional IL TAC. May need additional treatment. May not be effective.    Lot #:[id number]  Exp: April 2020      Follow up in 4 weeks for injections      Again, thank you for allowing me to participate in the care of your patient.        Sincerely,        Aditi Faustin PA-C     4 = No assist / stand by assistance

## 2022-05-18 ENCOUNTER — TRANSCRIPTION ENCOUNTER (OUTPATIENT)
Age: 82
End: 2022-05-18

## 2022-05-18 ENCOUNTER — RESULT REVIEW (OUTPATIENT)
Age: 82
End: 2022-05-18

## 2022-05-18 LAB
ANION GAP SERPL CALC-SCNC: 10 MMOL/L — SIGNIFICANT CHANGE UP (ref 7–14)
BUN SERPL-MCNC: 12 MG/DL — SIGNIFICANT CHANGE UP (ref 7–23)
CALCIUM SERPL-MCNC: 9.3 MG/DL — SIGNIFICANT CHANGE UP (ref 8.4–10.5)
CHLORIDE SERPL-SCNC: 100 MMOL/L — SIGNIFICANT CHANGE UP (ref 98–107)
CO2 SERPL-SCNC: 27 MMOL/L — SIGNIFICANT CHANGE UP (ref 22–31)
CREAT SERPL-MCNC: 1.1 MG/DL — SIGNIFICANT CHANGE UP (ref 0.5–1.3)
CRP SERPL-MCNC: <4 MG/L — SIGNIFICANT CHANGE UP
EGFR: 67 ML/MIN/1.73M2 — SIGNIFICANT CHANGE UP
ERYTHROCYTE [SEDIMENTATION RATE] IN BLOOD: 7 MM/HR — SIGNIFICANT CHANGE UP (ref 1–15)
GLUCOSE SERPL-MCNC: 93 MG/DL — SIGNIFICANT CHANGE UP (ref 70–99)
HCT VFR BLD CALC: 43.6 % — SIGNIFICANT CHANGE UP (ref 39–50)
HGB BLD-MCNC: 14.7 G/DL — SIGNIFICANT CHANGE UP (ref 13–17)
MAGNESIUM SERPL-MCNC: 1.8 MG/DL — SIGNIFICANT CHANGE UP (ref 1.6–2.6)
MCHC RBC-ENTMCNC: 29.3 PG — SIGNIFICANT CHANGE UP (ref 27–34)
MCHC RBC-ENTMCNC: 33.7 GM/DL — SIGNIFICANT CHANGE UP (ref 32–36)
MCV RBC AUTO: 87 FL — SIGNIFICANT CHANGE UP (ref 80–100)
NRBC # BLD: 0 /100 WBCS — SIGNIFICANT CHANGE UP
NRBC # FLD: 0 K/UL — SIGNIFICANT CHANGE UP
PHOSPHATE SERPL-MCNC: 3.3 MG/DL — SIGNIFICANT CHANGE UP (ref 2.5–4.5)
PLATELET # BLD AUTO: 330 K/UL — SIGNIFICANT CHANGE UP (ref 150–400)
POTASSIUM SERPL-MCNC: 3.9 MMOL/L — SIGNIFICANT CHANGE UP (ref 3.5–5.3)
POTASSIUM SERPL-SCNC: 3.9 MMOL/L — SIGNIFICANT CHANGE UP (ref 3.5–5.3)
RBC # BLD: 5.01 M/UL — SIGNIFICANT CHANGE UP (ref 4.2–5.8)
RBC # FLD: 12.3 % — SIGNIFICANT CHANGE UP (ref 10.3–14.5)
SODIUM SERPL-SCNC: 137 MMOL/L — SIGNIFICANT CHANGE UP (ref 135–145)
T3 SERPL-MCNC: 96 NG/DL — SIGNIFICANT CHANGE UP (ref 80–200)
T4 AB SER-ACNC: 4.88 UG/DL — LOW (ref 5.1–13)
T4 FREE SERPL-MCNC: 1 NG/DL — SIGNIFICANT CHANGE UP (ref 0.9–1.8)
WBC # BLD: 7.25 K/UL — SIGNIFICANT CHANGE UP (ref 3.8–10.5)
WBC # FLD AUTO: 7.25 K/UL — SIGNIFICANT CHANGE UP (ref 3.8–10.5)

## 2022-05-18 PROCEDURE — 45331 SIGMOIDOSCOPY AND BIOPSY: CPT | Mod: GC

## 2022-05-18 PROCEDURE — 99233 SBSQ HOSP IP/OBS HIGH 50: CPT

## 2022-05-18 PROCEDURE — 88305 TISSUE EXAM BY PATHOLOGIST: CPT | Mod: 26

## 2022-05-18 RX ORDER — CIPROFLOXACIN LACTATE 400MG/40ML
400 VIAL (ML) INTRAVENOUS EVERY 12 HOURS
Refills: 0 | Status: DISCONTINUED | OUTPATIENT
Start: 2022-05-18 | End: 2022-05-19

## 2022-05-18 RX ORDER — METRONIDAZOLE 500 MG
500 TABLET ORAL EVERY 8 HOURS
Refills: 0 | Status: DISCONTINUED | OUTPATIENT
Start: 2022-05-18 | End: 2022-05-19

## 2022-05-18 RX ADMIN — Medication 100 MILLIGRAM(S): at 21:06

## 2022-05-18 RX ADMIN — Medication 200 MILLIGRAM(S): at 14:56

## 2022-05-18 RX ADMIN — Medication 81 MILLIGRAM(S): at 11:51

## 2022-05-18 RX ADMIN — Medication 50 MILLIGRAM(S): at 05:47

## 2022-05-18 RX ADMIN — Medication 100 MILLIGRAM(S): at 11:51

## 2022-05-18 RX ADMIN — SODIUM CHLORIDE 3 MILLILITER(S): 9 INJECTION INTRAMUSCULAR; INTRAVENOUS; SUBCUTANEOUS at 12:15

## 2022-05-18 RX ADMIN — SENNA PLUS 2 TABLET(S): 8.6 TABLET ORAL at 21:12

## 2022-05-18 RX ADMIN — ATORVASTATIN CALCIUM 80 MILLIGRAM(S): 80 TABLET, FILM COATED ORAL at 21:11

## 2022-05-18 RX ADMIN — TAMSULOSIN HYDROCHLORIDE 0.8 MILLIGRAM(S): 0.4 CAPSULE ORAL at 21:12

## 2022-05-18 RX ADMIN — SODIUM CHLORIDE 3 MILLILITER(S): 9 INJECTION INTRAMUSCULAR; INTRAVENOUS; SUBCUTANEOUS at 05:56

## 2022-05-18 RX ADMIN — Medication 100 MILLIGRAM(S): at 14:56

## 2022-05-18 RX ADMIN — CLOPIDOGREL BISULFATE 75 MILLIGRAM(S): 75 TABLET, FILM COATED ORAL at 11:51

## 2022-05-18 RX ADMIN — POLYETHYLENE GLYCOL 3350 17 GRAM(S): 17 POWDER, FOR SOLUTION ORAL at 11:50

## 2022-05-18 RX ADMIN — ENOXAPARIN SODIUM 40 MILLIGRAM(S): 100 INJECTION SUBCUTANEOUS at 05:47

## 2022-05-18 RX ADMIN — LISINOPRIL 5 MILLIGRAM(S): 2.5 TABLET ORAL at 05:47

## 2022-05-18 NOTE — PROGRESS NOTE ADULT - PROBLEM SELECTOR PLAN 5
Patient on lisinopril and metoprolol succinate 50 mg ER.  Will continue with hold parameters.
Patient on lisinopril and metoprolol succinate 50 mg ER.  Will continue with hold parameters.

## 2022-05-18 NOTE — DISCHARGE NOTE PROVIDER - NSDCFUADDAPPT_GEN_ALL_CORE_FT
Please follow up with gastro ___ for follow up colonoscopy in ___ weeks    Please follow up with your primary care provider in 1-2 weeks for management and further evaluation.      Please follow up with a pulmonologist to evaluate incidental lung findings Please follow up with your primary care provider in 1-2 weeks for management and further evaluation. Additionally, your primary care provider can provide additional references to evaluate incidental lung and kidney findings. GI providers will reach out to you regarding the results of the biopsy taken during the flex sigmoidoscopy.

## 2022-05-18 NOTE — PROGRESS NOTE ADULT - PROBLEM SELECTOR PLAN 10
unclear etiology at this time  started bowel regimen  encourage PO fluids
unclear etiology at this time  started bowel regimen  encourage PO fluids

## 2022-05-18 NOTE — DISCHARGE NOTE PROVIDER - CARE PROVIDER_API CALL
Joel Varghese)  Family Medicine  [address], [address]  [place] [address]  Phone: [phone]  Fax: [phone]  Follow Up Time:

## 2022-05-18 NOTE — PROGRESS NOTE ADULT - ASSESSMENT
83 y/o Male with MHx of CAD (s/p 1 stent in 2016, on Aspirin and Plavix), Hypertension, hyperlipidemia, Diverticulosis, LBBB a/w lower abdominal pain due to acute colitis of unclear etiology vs mesenteric ischemia. Incidental finding of bronchiectasis and bilateral renal cysts;

## 2022-05-18 NOTE — PROGRESS NOTE ADULT - PROBLEM SELECTOR PLAN 8
Continue alfuzosin 10 mg daily.  Monitor for retention.
Continue alfuzosin 10 mg daily.  Monitor for retention.

## 2022-05-18 NOTE — PROGRESS NOTE ADULT - SUBJECTIVE AND OBJECTIVE BOX
[facility] Division of Hospital Medicine  Sangeeta Houston MD  Pager [phone]    Patient is a 82y old  Male who presents with a chief complaint of Abdominal pain      SUBJECTIVE / OVERNIGHT EVENTS: pt reported small BM last PM after miralax and none since; await for flex sig today; to get enema prior to flex sig; abd cramping less      MEDICATIONS  (STANDING):  allopurinol 100 milliGRAM(s) Oral daily  aspirin enteric coated 81 milliGRAM(s) Oral daily  atorvastatin 80 milliGRAM(s) Oral at bedtime  clopidogrel Tablet 75 milliGRAM(s) Oral daily  enoxaparin Injectable 40 milliGRAM(s) SubCutaneous every 24 hours  lisinopril 5 milliGRAM(s) Oral daily  metoprolol succinate ER 50 milliGRAM(s) Oral daily  polyethylene glycol 3350 17 Gram(s) Oral daily  senna 2 Tablet(s) Oral at bedtime  sodium chloride 0.9% lock flush 3 milliLiter(s) IV Push every 8 hours  tamsulosin 0.8 milliGRAM(s) Oral at bedtime    PHYSICAL EXAM:  Vital Signs Last 24 Hrs  T(F): 97.4 (18 May 2022 12:29), Max: 98.3 (17 May 2022 22:27)  HR: 57 (18 May 2022 12:29) (57 - 109)  BP: 152/66 (18 May 2022 12:29) (131/80 - 152/66)  RR: 24 (18 May 2022 12:29) (18 - 24)  SpO2: 95% (18 May 2022 12:29) (95% - 100%)    CONSTITUTIONAL: NAD, appears comfortable  EYES: PERRLA; conjunctiva and sclera clear  ENMT: Moist oral mucosa; normal dentition  RESPIRATORY: Normal respiratory effort; lungs are clear to auscultation bilaterally  CARDIOVASCULAR: Regular rate and rhythm; No lower extremity edema;  ABDOMEN: Nontender to palpation, normoactive bowel sounds, soft  MUSCULOSKELETAL:  no clubbing or cyanosis of digits; no joint swelling or tenderness to palpation  PSYCH: A+O to person, place, and time; affect appropriate  NEUROLOGY: CN 2-12 are intact and symmetric; no gross sensory deficits   SKIN: No rashes; no palpable lesions    LABS:                        14.7   7.25  )-----------( 330      ( 18 May 2022 08:25 )             43.6     05-18    137  |  100  |  12  ----------------------------<  93  3.9   |  27  |  1.10    Ca    9.3      18 May 2022 06:17  Phos  3.3     05-18  Mg     1.80     05-18    TPro  7.2  /  Alb  4.6  /  TBili  0.7  /  DBili  x   /  AST  28  /  ALT  23  /  AlkPhos  72  05-16    PT/INR - ( 16 May 2022 18:00 )   PT: 12.4 sec;   INR: 1.07 ratio         PTT - ( 16 May 2022 18:00 )  PTT:29.1 sec

## 2022-05-18 NOTE — PROGRESS NOTE ADULT - PROBLEM SELECTOR PLAN 3
CTAP w/oral contrast incidentally showing Bilateral renal cysts measuring up to 6.1 cm arising from the lateral midpole of the right kidney. Otherwise, within normal limits.  Outpatient follow up with PCP
CTAP w/oral contrast incidentally showing Bilateral renal cysts measuring up to 6.1 cm arising from the lateral midpole of the right kidney. Otherwise, within normal limits.  Outpatient follow up with PCP

## 2022-05-18 NOTE — DISCHARGE NOTE PROVIDER - HOSPITAL COURSE
83 y/o Male with PMHx of CAD (s/p ALEXIS 2016, on Aspirin and Plavix), HTN, HLD, Diverticulosis, LBBB  who presented to the ED with crampy lower abdominal pain x 1 week. Patient reported to PCP with cramping pain localized to lower abdomen starting 1 weeks ago followed by non-bloody BM. Abdominal pain reoccurred each night, approx. 4-6 hours after patient last ate food. Despite despite urge to defecate with onset of abdominal pain, patient has not had any further BMs. With the exception of initial episode of pain, patient has not had any vomiting. He denies diarrhea, fever, rectal bleeding prior to onset of pain episodes. PCP ordered outpatient CT A/P which revealed "ischemia" later demonstrated as narrowing of SMA and celiac artery due to atherosclerotic plaque with segmental bowel wall thickening in descending colon.     In ED, ESR and CRP wnl. Lipase wnl. CT A/P with oral contrast and CTA A/P demonstrated short segment mild wall thickening and adjacent fat stranding of the proximal and mid descending colon and patency of Celiac axis, SMA, and MICHELLE with mild narrowing/ Portal and mesenteric veins are patent. No evidence of mesenteric vascular occlusion. Given the anatomic distribution and evidence of atherosclerotic disease, differential considerations include watershed ischemic colitis. Surgery and vascular surgery evaluated the patient and found no indictions for acute surgical interventions. GI was consulted and evaluated the patient with a flexible sigmoidoscopy, which found a segment of congested, erythematous, and granular mucosa with superficial ulceration was found in the descending colon along with non bleeding medium sized internal hemorrhoids. Colon wall biopsied. Gi recommended a short course of broad spectrum antibiotics, and patient was started on IV Cipro/ Flagyl on 5/18. Patient was placed back on a regular diet which he tolerated well ____ .  Cipro/ flagyl transitioned to PO and patient was cleared for discharge with outpatient GI follow up. Patient to have a full colonoscopy as an outpatient.     Patient found to have incidental findings of bronchiectasis and bilateral renal cysts (measuring up to 6.1 cm arising from the lateral midpole of the right kidney) on CT. Patient to follow outpatient PCP and Pulmonology for further evaluation.     Patient with CAD, HTN, Gout, HLD and BPH. Patient treated with home medications inpatient and will continue home meds after discharge. Patient to follow up with PCP for continued monitoring and management.     On 5/18/22, discussed with __________, patient is medically cleared and optimized for discharge today. All medications were reviewed with attending, and sent to mutually agreed upon pharmacy. 81 y/o Male with PMHx of CAD (s/p ALEXIS 2016, on Aspirin and Plavix), HTN, HLD, Diverticulosis, LBBB  who presented to the ED with crampy lower abdominal pain x 1 week. Patient reported to PCP with cramping pain localized to lower abdomen starting 1 weeks ago followed by non-bloody BM. Abdominal pain reoccurred each night, approx. 4-6 hours after patient last ate food. Despite despite urge to defecate with onset of abdominal pain, patient has not had any further BMs. With the exception of initial episode of pain, patient has not had any vomiting. He denies diarrhea, fever, rectal bleeding prior to onset of pain episodes. PCP ordered outpatient CT A/P which revealed "ischemia" later demonstrated as narrowing of SMA and celiac artery due to atherosclerotic plaque with segmental bowel wall thickening in descending colon.     In ED, ESR and CRP wnl. Lipase wnl. CT A/P with oral contrast and CTA A/P demonstrated short segment mild wall thickening and adjacent fat stranding of the proximal and mid descending colon and patency of Celiac axis, SMA, and MICHELLE with mild narrowing/ Portal and mesenteric veins are patent. No evidence of mesenteric vascular occlusion. Given the anatomic distribution and evidence of atherosclerotic disease, differential considerations include watershed ischemic colitis. Surgery and vascular surgery evaluated the patient and found no indictions for acute surgical interventions. GI was consulted and evaluated the patient with a flexible sigmoidoscopy, which found a segment of congested, erythematous, and granular mucosa with superficial ulceration was found in the descending colon along with non bleeding medium sized internal hemorrhoids. Colon wall biopsied. Gi recommended a short course of broad spectrum antibiotics, and patient was started on IV Cipro/ Flagyl on 5/18. Patient was placed back on a regular diet which he tolerated well.  Cipro/ flagyl transitioned to PO and patient will recieve a total of 5 days of antibiotics (complete on 5/22). GI cleared the patient for discharge and will follow up pathology results with the patient. Patient to follow up with primary care provider.      Patient found to have incidental findings of bronchiectasis and bilateral renal cysts (measuring up to 6.1 cm arising from the lateral midpole of the right kidney) on CT. Patient to follow outpatient PCP and Pulmonology for further evaluation.     Patient with CAD, HTN, Gout, HLD and BPH. Patient treated with home medications inpatient and will continue home meds after discharge. Patient to follow up with PCP for continued monitoring and management.     On 5/19/22, discussed with Dr. Houston, patient is medically cleared and optimized for discharge today. All medications were reviewed with attending, and sent to mutually agreed upon pharmacy.

## 2022-05-18 NOTE — PROGRESS NOTE ADULT - PROBLEM SELECTOR PLAN 2
asymptomatic  -Outpatient follow up with PCP/ pulmonology
CTAP w/oral contrast showing moderate subpleural reticulation with traction bronchiectasis, compatible with interstitial fibrosis. No honeycombing is identified.  Patient denies cough, shortness of breath.  -Outpatient follow up with PCP/ pulmonology

## 2022-05-18 NOTE — DISCHARGE NOTE PROVIDER - NSDCFUSCHEDAPPT_GEN_ALL_CORE_FT
[facility]  [facility] [address]  Scheduled Appointment: 07/12/2022

## 2022-05-18 NOTE — DISCHARGE NOTE PROVIDER - NSDCMRMEDTOKEN_GEN_ALL_CORE_FT
alfuzosin 10 mg oral tablet, extended release: 1 tab(s) orally once a day  allopurinol 100 mg oral tablet: 1 tab(s) orally once a day  Aspirin Enteric Coated 81 mg oral delayed release tablet: 1 tab(s) orally once a day  ezetimibe 10 mg oral tablet: 1 tab(s) orally once a day  famotidine 20 mg oral tablet: 1 tab(s) orally 2 times a day  lisinopril 5 mg oral tablet: 1 tab(s) orally once a day  Metoprolol Succinate ER 50 mg oral tablet, extended release: 1 tab(s) orally once a day  Plavix 75 mg oral tablet: 1 tab(s) orally once a day  rosuvastatin 40 mg oral tablet: 1 tab(s) orally once a day   alfuzosin 10 mg oral tablet, extended release: 1 tab(s) orally once a day  allopurinol 100 mg oral tablet: 1 tab(s) orally once a day  Aspirin Enteric Coated 81 mg oral delayed release tablet: 1 tab(s) orally once a day  ciprofloxacin 500 mg oral tablet: 1 tab(s) orally every 12 hours   ezetimibe 10 mg oral tablet: 1 tab(s) orally once a day  famotidine 20 mg oral tablet: 1 tab(s) orally 2 times a day  lisinopril 5 mg oral tablet: 1 tab(s) orally once a day  Metoprolol Succinate ER 50 mg oral tablet, extended release: 1 tab(s) orally once a day  metroNIDAZOLE 500 mg oral tablet: 1 tab(s) orally every 8 hours   Plavix 75 mg oral tablet: 1 tab(s) orally once a day  polyethylene glycol 3350 oral powder for reconstitution: 17 gram(s) orally once a day  rosuvastatin 40 mg oral tablet: 1 tab(s) orally once a day  senna oral tablet: 2 tab(s) orally once a day (at bedtime)

## 2022-05-18 NOTE — PROGRESS NOTE ADULT - PROBLEM SELECTOR PLAN 1
CTAP w/ oral contrast showing Segmental concentric wall thickening and adjacent stranding of the proximal and mid descending colon. Given the anatomic distribution and evidence of atherosclerotic disease, differential considerations include watershed ischemic colitis.  CTA abdomen and pelvis w/IV contrast preliminary report: short segment mild wall thickening and adjacent fat stranding of the proximal and mid descending colon   poss stercoral colitis, bowel regimen started  await GI f/u  Clear liquid diet for now  Pain control PRN
plan for flex sig today  plan for pre-procedure enema

## 2022-05-18 NOTE — PROGRESS NOTE ADULT - PROBLEM SELECTOR PLAN 4
No CP or palpitations; No NASH;  Continue Aspirin, Plavix and metoprolol  Screening EKG: no acute changes
No CP or palpitations; No NASH;  Continue Aspirin, Plavix and metoprolol  Screening EKG: no acute changes

## 2022-05-18 NOTE — DISCHARGE NOTE PROVIDER - NSDCCPCAREPLAN_GEN_ALL_CORE_FT
PRINCIPAL DISCHARGE DIAGNOSIS  Diagnosis: Colitis  Assessment and Plan of Treatment: -Based on CT imaging and flexible sigmoidoscopy, you were found to have inflammation of your colon wall  - The exact cause of this inflammation is unclear, but based on the narrowing of several blood vessels around the areas of inflammation, a lack of blood flow is likely contributing to this inflammation  - You were treated with broad spectrum antibiotics and pain/ constiaption control while inpatient. Please continue to take your medications as directed once discharged form the hospital  - A contributing factor to the narrowing of these blood vessels are your chronic conditions (high blood presure, high cholesterol, coronary artery disease, etc.) Please continue to take your medications to manage these conditions as directed   - Please follow up with your primary care provider and GI for continued monitoring and treatment    - Please obtain a follow up colonoscopy in _______      SECONDARY DISCHARGE DIAGNOSES  Diagnosis: Hypertension  Assessment and Plan of Treatment: - You have elevated blood pressure  - Please continue to take your blood pressure medications as directed  - Please follow up with your primary care provider for continued monitoring and treatment    Diagnosis: Hyperlipidemia  Assessment and Plan of Treatment: - You have elevated cholesterol  - Please continue to take your cholesterol medications as directed  - Please follow up with your primary care provider for continued monitoring and treatment    Diagnosis: BPH (benign prostatic hyperplasia)  Assessment and Plan of Treatment: - Please take your medications as directed  - Please follow up with your primary care provider for continued monitoring and treatment    Diagnosis: CAD (coronary artery disease)  Assessment and Plan of Treatment: - Please take your medications as directed  - Please follow up with your primary care provider for continued monitoring and treatment    Diagnosis: Bilateral renal cysts  Assessment and Plan of Treatment: - You were found to have bilateral renal cysts on imaging  - This is an incidental finding as it is not causing symptoms  - Please follow up with your primary care provider for further evaluation and management    Diagnosis: Bronchiectasis  Assessment and Plan of Treatment: - You were found to lung findings on imaging  - This is an incidental finding as it is not causing symptoms  - Please follow up with your primary care provider for further evaluation and management     PRINCIPAL DISCHARGE DIAGNOSIS  Diagnosis: Colitis  Assessment and Plan of Treatment: -Based on CT imaging and flexible sigmoidoscopy, you were found to have inflammation of your colon wall  - The exact cause of this inflammation is unclear, but based on the narrowing of several blood vessels around the areas of inflammation, a lack of blood flow is likely contributing to this inflammation  - You were treated with broad spectrum antibiotics and pain/ constiaption control while inpatient. Please continue to take your antibiotics for a total of 5 days as directed once discharged form the hospital. Additionally, please continue to take laxatives/stool softeners as directed  - A contributing factor to the narrowing of these blood vessels are your chronic conditions (high blood presure, high cholesterol, coronary artery disease, etc.) Please continue to take your medications to manage these conditions as directed   - Please follow up with your primary care provider for continued monitoring and treatment  - GI providers will follow up with you when they have the results of the colon biospy obtained during flexible sigmoidoscopy  - Please obtain a follow up colonoscopy via PCP      SECONDARY DISCHARGE DIAGNOSES  Diagnosis: Hypertension  Assessment and Plan of Treatment: - You have elevated blood pressure  - Please continue to take your blood pressure medications as directed  - Please follow up with your primary care provider for continued monitoring and treatment    Diagnosis: Hyperlipidemia  Assessment and Plan of Treatment: - You have elevated cholesterol  - Please continue to take your cholesterol medications as directed  - Please follow up with your primary care provider for continued monitoring and treatment    Diagnosis: BPH (benign prostatic hyperplasia)  Assessment and Plan of Treatment: - Please take your medications as directed  - Please follow up with your primary care provider for continued monitoring and treatment    Diagnosis: CAD (coronary artery disease)  Assessment and Plan of Treatment: - Please take your medications as directed  - Please follow up with your primary care provider for continued monitoring and treatment    Diagnosis: Bilateral renal cysts  Assessment and Plan of Treatment: - You were found to have bilateral renal cysts on imaging  - This is an incidental finding as it is not causing symptoms  - Please follow up with your primary care provider for further evaluation and management    Diagnosis: Bronchiectasis  Assessment and Plan of Treatment: - You were found to lung findings on imaging  - This is an incidental finding as it is not causing symptoms  - Please follow up with your primary care provider for further evaluation and management     PRINCIPAL DISCHARGE DIAGNOSIS  Diagnosis: Colitis  Assessment and Plan of Treatment: -Based on CT imaging and flexible sigmoidoscopy, you were found to have inflammation of your colon wall; your flexible sigmoidoscopy revealed likely ischemic colitis  - The exact cause of this inflammation is unclear, but based on the narrowing of several blood vessels around the areas of inflammation, a lack of blood flow is likely contributing to this inflammation  - You were treated with broad spectrum antibiotics and pain/ constiaption control while inpatient. Please continue to take your antibiotics for a total of 5 days as directed once discharged form the hospital. Additionally, please continue to take laxatives/stool softeners as directed  - A contributing factor to the narrowing of these blood vessels are your chronic conditions (high blood presure, high cholesterol, coronary artery disease, etc.) Please continue to take your medications to manage these conditions as directed   - Please follow up with your primary care provider for continued monitoring and treatment  - GI providers will follow up with you when they have the results of the colon biospy obtained during flexible sigmoidoscopy  - Please obtain a follow up colonoscopy via PCP      SECONDARY DISCHARGE DIAGNOSES  Diagnosis: Hypertension  Assessment and Plan of Treatment: - You have elevated blood pressure  - Please continue to take your blood pressure medications as directed  - Please follow up with your primary care provider for continued monitoring and treatment    Diagnosis: Hyperlipidemia  Assessment and Plan of Treatment: - You have elevated cholesterol  - Please continue to take your cholesterol medications as directed  - Please follow up with your primary care provider for continued monitoring and treatment    Diagnosis: BPH (benign prostatic hyperplasia)  Assessment and Plan of Treatment: - Please take your medications as directed  - Please follow up with your primary care provider for continued monitoring and treatment    Diagnosis: CAD (coronary artery disease)  Assessment and Plan of Treatment: - Please take your medications as directed  - Please follow up with your primary care provider for continued monitoring and treatment    Diagnosis: Bilateral renal cysts  Assessment and Plan of Treatment: - You were found to have bilateral renal cysts on imaging  - This is an incidental finding as it is not causing symptoms  - Please follow up with your primary care provider for further evaluation and management    Diagnosis: Bronchiectasis  Assessment and Plan of Treatment: - You were found to lung findings on imaging  - This is an incidental finding as it is not causing symptoms  - Please follow up with your primary care provider for further evaluation and management

## 2022-05-18 NOTE — DISCHARGE NOTE PROVIDER - NSFOLLOWUPCLINICS_GEN_ALL_ED_FT
[facility] Pulmonolgy and Sleep Medicine  Pulmonology  [address] [place] [address], [place] [address]  Phone: [phone]  Fax:

## 2022-05-19 ENCOUNTER — TRANSCRIPTION ENCOUNTER (OUTPATIENT)
Age: 82
End: 2022-05-19

## 2022-05-19 VITALS
OXYGEN SATURATION: 95 % | RESPIRATION RATE: 17 BRPM | DIASTOLIC BLOOD PRESSURE: 57 MMHG | TEMPERATURE: 98 F | HEART RATE: 70 BPM | SYSTOLIC BLOOD PRESSURE: 142 MMHG

## 2022-05-19 LAB
ANION GAP SERPL CALC-SCNC: 9 MMOL/L — SIGNIFICANT CHANGE UP (ref 7–14)
BUN SERPL-MCNC: 16 MG/DL — SIGNIFICANT CHANGE UP (ref 7–23)
CALCIUM SERPL-MCNC: 8.8 MG/DL — SIGNIFICANT CHANGE UP (ref 8.4–10.5)
CHLORIDE SERPL-SCNC: 102 MMOL/L — SIGNIFICANT CHANGE UP (ref 98–107)
CO2 SERPL-SCNC: 27 MMOL/L — SIGNIFICANT CHANGE UP (ref 22–31)
CREAT SERPL-MCNC: 1.14 MG/DL — SIGNIFICANT CHANGE UP (ref 0.5–1.3)
EGFR: 64 ML/MIN/1.73M2 — SIGNIFICANT CHANGE UP
GLUCOSE SERPL-MCNC: 95 MG/DL — SIGNIFICANT CHANGE UP (ref 70–99)
HCT VFR BLD CALC: 41.7 % — SIGNIFICANT CHANGE UP (ref 39–50)
HGB BLD-MCNC: 14.1 G/DL — SIGNIFICANT CHANGE UP (ref 13–17)
MAGNESIUM SERPL-MCNC: 1.8 MG/DL — SIGNIFICANT CHANGE UP (ref 1.6–2.6)
MCHC RBC-ENTMCNC: 30.1 PG — SIGNIFICANT CHANGE UP (ref 27–34)
MCHC RBC-ENTMCNC: 33.8 GM/DL — SIGNIFICANT CHANGE UP (ref 32–36)
MCV RBC AUTO: 89.1 FL — SIGNIFICANT CHANGE UP (ref 80–100)
NRBC # BLD: 0 /100 WBCS — SIGNIFICANT CHANGE UP
NRBC # FLD: 0 K/UL — SIGNIFICANT CHANGE UP
PHOSPHATE SERPL-MCNC: 3.7 MG/DL — SIGNIFICANT CHANGE UP (ref 2.5–4.5)
PLATELET # BLD AUTO: 323 K/UL — SIGNIFICANT CHANGE UP (ref 150–400)
POTASSIUM SERPL-MCNC: 4 MMOL/L — SIGNIFICANT CHANGE UP (ref 3.5–5.3)
POTASSIUM SERPL-SCNC: 4 MMOL/L — SIGNIFICANT CHANGE UP (ref 3.5–5.3)
RBC # BLD: 4.68 M/UL — SIGNIFICANT CHANGE UP (ref 4.2–5.8)
RBC # FLD: 12.2 % — SIGNIFICANT CHANGE UP (ref 10.3–14.5)
SODIUM SERPL-SCNC: 138 MMOL/L — SIGNIFICANT CHANGE UP (ref 135–145)
WBC # BLD: 7.6 K/UL — SIGNIFICANT CHANGE UP (ref 3.8–10.5)
WBC # FLD AUTO: 7.6 K/UL — SIGNIFICANT CHANGE UP (ref 3.8–10.5)

## 2022-05-19 PROCEDURE — 99232 SBSQ HOSP IP/OBS MODERATE 35: CPT | Mod: GC

## 2022-05-19 PROCEDURE — 99239 HOSP IP/OBS DSCHRG MGMT >30: CPT

## 2022-05-19 RX ORDER — METRONIDAZOLE 500 MG
1 TABLET ORAL
Qty: 12 | Refills: 0
Start: 2022-05-19 | End: 2022-05-22

## 2022-05-19 RX ORDER — SENNA PLUS 8.6 MG/1
2 TABLET ORAL
Qty: 0 | Refills: 0 | DISCHARGE
Start: 2022-05-19

## 2022-05-19 RX ORDER — POLYETHYLENE GLYCOL 3350 17 G/17G
17 POWDER, FOR SOLUTION ORAL
Qty: 0 | Refills: 0 | DISCHARGE
Start: 2022-05-19

## 2022-05-19 RX ORDER — CIPROFLOXACIN LACTATE 400MG/40ML
1 VIAL (ML) INTRAVENOUS
Qty: 8 | Refills: 0
Start: 2022-05-19 | End: 2022-05-22

## 2022-05-19 RX ADMIN — Medication 100 MILLIGRAM(S): at 13:39

## 2022-05-19 RX ADMIN — ENOXAPARIN SODIUM 40 MILLIGRAM(S): 100 INJECTION SUBCUTANEOUS at 06:07

## 2022-05-19 RX ADMIN — Medication 100 MILLIGRAM(S): at 05:59

## 2022-05-19 RX ADMIN — Medication 81 MILLIGRAM(S): at 13:40

## 2022-05-19 RX ADMIN — Medication 100 MILLIGRAM(S): at 13:32

## 2022-05-19 RX ADMIN — SODIUM CHLORIDE 3 MILLILITER(S): 9 INJECTION INTRAMUSCULAR; INTRAVENOUS; SUBCUTANEOUS at 00:39

## 2022-05-19 RX ADMIN — SODIUM CHLORIDE 3 MILLILITER(S): 9 INJECTION INTRAMUSCULAR; INTRAVENOUS; SUBCUTANEOUS at 13:19

## 2022-05-19 RX ADMIN — CLOPIDOGREL BISULFATE 75 MILLIGRAM(S): 75 TABLET, FILM COATED ORAL at 13:39

## 2022-05-19 RX ADMIN — Medication 50 MILLIGRAM(S): at 06:08

## 2022-05-19 RX ADMIN — SODIUM CHLORIDE 3 MILLILITER(S): 9 INJECTION INTRAMUSCULAR; INTRAVENOUS; SUBCUTANEOUS at 06:45

## 2022-05-19 RX ADMIN — Medication 200 MILLIGRAM(S): at 13:32

## 2022-05-19 RX ADMIN — LISINOPRIL 5 MILLIGRAM(S): 2.5 TABLET ORAL at 06:08

## 2022-05-19 RX ADMIN — POLYETHYLENE GLYCOL 3350 17 GRAM(S): 17 POWDER, FOR SOLUTION ORAL at 13:39

## 2022-05-19 RX ADMIN — Medication 200 MILLIGRAM(S): at 02:13

## 2022-05-19 NOTE — CHART NOTE - NSCHARTNOTEFT_GEN_A_CORE
pt seen and examined by me  feeling well  candi po  no further BM since enema pre-procedure  abd discomfort improved  d/w Dr Candelario from GI, ok for dc, f/u with PMD; GI will call with path results  total 5 days abx for poss ischemic colitis  medically stable for dc   33 min spent with dc planning

## 2022-05-19 NOTE — DISCHARGE NOTE NURSING/CASE MANAGEMENT/SOCIAL WORK - PATIENT PORTAL LINK FT
You can access the FollowMyHealth Patient Portal offered by [facility] by registering at the following website: [email or web address]. By joining [facility]’s FollowMyHealth portal, you will also be able to view your health information using other applications (apps) compatible with our system.

## 2022-05-19 NOTE — PROGRESS NOTE ADULT - ATTENDING COMMENTS
Clinically improving. Planned for likely discharge today. S/p flex sig yesterday for LLQ pain and CT findings c/f descending colitis. Endoscopic evaluation with segment of colopathy in descending colon c/f colonic ischemia. No vascular interventions planned per vascular surgery team. Continue supportive care. Reasonable to complete short course of antibiotics given patient's age and symptoms required hospitalization. No further inpatient GI management planned at this time.
CTA results noted.  No additional vascular surgery workup intervention needed.  Cont aspirin, plavix, statin.  Vascular surgery will sign off. Reconsult as needed.

## 2022-05-19 NOTE — PROGRESS NOTE ADULT - SUBJECTIVE AND OBJECTIVE BOX
Chief Complaint:  Patient is a 82y old  Male who presents with a chief complaint of Abdominal pain (19 May 2022 11:01)      Interval Events:     s/p colonoscopy yesterday revealing moderate to severe colitis in segment of descending colon - biopsied  patient has no complaints today, ate food with only minimal abdominal pain last night     Allergies:  No Known Allergies      Hospital Medications:  allopurinol 100 milliGRAM(s) Oral daily  aspirin enteric coated 81 milliGRAM(s) Oral daily  atorvastatin 80 milliGRAM(s) Oral at bedtime  ciprofloxacin   IVPB 400 milliGRAM(s) IV Intermittent every 12 hours  clopidogrel Tablet 75 milliGRAM(s) Oral daily  enoxaparin Injectable 40 milliGRAM(s) SubCutaneous every 24 hours  lisinopril 5 milliGRAM(s) Oral daily  metoprolol succinate ER 50 milliGRAM(s) Oral daily  metroNIDAZOLE  IVPB 500 milliGRAM(s) IV Intermittent every 8 hours  polyethylene glycol 3350 17 Gram(s) Oral daily  senna 2 Tablet(s) Oral at bedtime  sodium chloride 0.9% lock flush 3 milliLiter(s) IV Push every 8 hours  tamsulosin 0.8 milliGRAM(s) Oral at bedtime      PMHX/PSHX:  CAD (coronary artery disease)    Hyperlipidemia    Gout    BPH (benign prostatic hyperplasia)    History of hand surgery    Stented coronary artery        Family history:  FH: CAD (coronary artery disease)        ROS:     General:  No weight loss, fevers, chills, night sweats, fatigue   Eyes:  No vision changes  ENT:  No sore throat, pain, runny nose  CV:  No chest pain, palpitations, dizziness   Resp:  No SOB, cough, wheezing  GI:  See HPI  :  No burning with urination, hematuria  Muscle:  No pain, weakness  Neuro:  No weakness/tingling, memory problems  Psych:  No fatigue, insomnia, mood problems, depression  Heme:  No easy bruisability  Skin:  No rash, edema      PHYSICAL EXAM:     GENERAL:  Elderly, well developed, well-nourished, no distress  HEENT:  Conjunctivae clear and pink,  no JVD; NCAT; oropharynx clear with moist mucous membranes and no mucosal ulcerations  NECK: Trachea midline; FROM, supple, no significant thyromegaly  LUNGS: lungs clear, with normal respiratory effort  CV: RRR, no murmurs  ABDOMEN: Soft, minimal TTP in suprapubic/LLQ, no r/g  EXTREMITIES: No peripheral edema   SKIN: Normal temperature, turgor and texture; no rash  PSYCH: Appropriate affect, alert and oriented to person, place and time  NEURO: No tremor, asterixis       Vital Signs:  Vital Signs Last 24 Hrs  T(C): 36.6 (19 May 2022 13:45), Max: 36.8 (19 May 2022 06:08)  T(F): 97.8 (19 May 2022 13:45), Max: 98.2 (19 May 2022 06:08)  HR: 70 (19 May 2022 13:45) (60 - 70)  BP: 142/57 (19 May 2022 13:45) (107/55 - 142/57)  BP(mean): --  RR: 17 (19 May 2022 13:45) (17 - 18)  SpO2: 95% (19 May 2022 13:45) (95% - 100%)  Daily     Daily     LABS:                        14.1   7.60  )-----------( 323      ( 19 May 2022 06:12 )             41.7     05-19    138  |  102  |  16  ----------------------------<  95  4.0   |  27  |  1.14    Ca    8.8      19 May 2022 06:12  Phos  3.7     05-19  Mg     1.80     05-19      Imaging:      [facility]  _______________________________________________________________________________  Patient Name: Ayan Pastor        Procedure Date: 5/18/2022 12:42 PM  MRN: [id number]                     Account Number: [id number]  YOB: 1940              Admit Type: Inpatient  Room: [place] [address]                         Gender: Male  Attending MD: IRIS MILES MD        _______________________________________________________________________________     Procedure:           Flexible Sigmoidoscopy  Indications:         Abnormal CT of the GI tract; descending colitis, concern                        for possible ischemia, ongoing abdominal pain  Providers:           IRIS MILES MD, JASS NEWMAN MD (Fellow)  Medicines:           Propofol per Anesthesia  Complications:       No immediate complications.      Findings:       A segment of congested, erythematous, and granular mucosa with        superficial ulceration was found in the descending colon. Biopsies were     taken with a cold forceps for histology.       The exam was otherwise normal throughout the examined colon, though        views limited due to prep quality.       Non-bleeding internal hemorrhoids were found during endoscopy. The        hemorrhoidswere medium-sized.                                                                                   Impression:          - A segment of congested, erythematous, and granular                        mucosa with superficial ulceration was found in the                        descending colon. Suspect ischemic colitis. Biopsied.                       - Non-bleeding internal hemorrhoids.  Recommendation:      - Return [facility] to [facility] for ongoing care.                       - Await pathology results.                       - Resume diet as tolerated.                       - Would consider short course of antibiotics for                        suspected colonic ischemia.                       - Further management per primary team and vascular                        surgery.                                                                                   Attending Participation:       I was present and participated during the entire procedure, including        non-key portions.             Interval Events:     s/p colonoscopy yesterday revealing moderate to severe colitis in segment of descending colon - biopsied  patient has no complaints today, ate food with only minimal abdominal pain last night     Allergies:  No Known Allergies      Hospital Medications:  allopurinol 100 milliGRAM(s) Oral daily  aspirin enteric coated 81 milliGRAM(s) Oral daily  atorvastatin 80 milliGRAM(s) Oral at bedtime  ciprofloxacin   IVPB 400 milliGRAM(s) IV Intermittent every 12 hours  clopidogrel Tablet 75 milliGRAM(s) Oral daily  enoxaparin Injectable 40 milliGRAM(s) SubCutaneous every 24 hours  lisinopril 5 milliGRAM(s) Oral daily  metoprolol succinate ER 50 milliGRAM(s) Oral daily  metroNIDAZOLE  IVPB 500 milliGRAM(s) IV Intermittent every 8 hours  polyethylene glycol 3350 17 Gram(s) Oral daily  senna 2 Tablet(s) Oral at bedtime  sodium chloride 0.9% lock flush 3 milliLiter(s) IV Push every 8 hours  tamsulosin 0.8 milliGRAM(s) Oral at bedtime      PMHX/PSHX:  CAD (coronary artery disease)    Hyperlipidemia    Gout    BPH (benign prostatic hyperplasia)    History of hand surgery    Stented coronary artery        Family history:  FH: CAD (coronary artery disease)        ROS:   14-point ROS reviewed and negative except as per HPI above      PHYSICAL EXAM:     GENERAL:  Elderly, well developed, well-nourished, no distress  HEENT:  Conjunctivae clear and pink,  no JVD; NCAT; oropharynx clear with moist mucous membranes and no mucosal ulcerations  NECK: Trachea midline; FROM, supple, no significant thyromegaly  LUNGS: lungs clear, with normal respiratory effort  CV: RRR, no murmurs  ABDOMEN: Soft, minimal TTP in suprapubic/LLQ, no r/g  EXTREMITIES: No peripheral edema   SKIN: Normal temperature, turgor and texture; no rash  PSYCH: Appropriate affect, alert and oriented to person, place and time  NEURO: No tremor, asterixis       Vital Signs:  Vital Signs Last 24 Hrs  T(C): 36.6 (19 May 2022 13:45), Max: 36.8 (19 May 2022 06:08)  T(F): 97.8 (19 May 2022 13:45), Max: 98.2 (19 May 2022 06:08)  HR: 70 (19 May 2022 13:45) (60 - 70)  BP: 142/57 (19 May 2022 13:45) (107/55 - 142/57)  BP(mean): --  RR: 17 (19 May 2022 13:45) (17 - 18)  SpO2: 95% (19 May 2022 13:45) (95% - 100%)  Daily     Daily     LABS:                        14.1   7.60  )-----------( 323      ( 19 May 2022 06:12 )             41.7     05-19    138  |  102  |  16  ----------------------------<  95  4.0   |  27  |  1.14    Ca    8.8      19 May 2022 06:12  Phos  3.7     05-19  Mg     1.80     05-19      Imaging:      [facility]  _______________________________________________________________________________  Patient Name: Ayan Pastor        Procedure Date: 5/18/2022 12:42 PM  MRN: [id number]                     Account Number: [id number]  YOB: 1940              Admit Type: Inpatient  Room: [place] [address]                         Gender: Male  Attending MD: IRIS MILES MD        _______________________________________________________________________________     Procedure:           Flexible Sigmoidoscopy  Indications:         Abnormal CT of the GI tract; descending colitis, concern                        for possible ischemia, ongoing abdominal pain  Providers:           IRIS MILES MD, JASS NEWMAN MD (Fellow)  Medicines:           Propofol per Anesthesia  Complications:       No immediate complications.      Findings:       A segment of congested, erythematous, and granular mucosa with        superficial ulceration was found in the descending colon. Biopsies were     taken with a cold forceps for histology.       The exam was otherwise normal throughout the examined colon, though        views limited due to prep quality.       Non-bleeding internal hemorrhoids were found during endoscopy. The        hemorrhoidswere medium-sized.                                                                                   Impression:          - A segment of congested, erythematous, and granular                        mucosa with superficial ulceration was found in the                        descending colon. Suspect ischemic colitis. Biopsied.                       - Non-bleeding internal hemorrhoids.  Recommendation:      - Return patient to hospital [facility] for ongoing care.                       - Await pathology results.                       - Resume diet as tolerated.                       - Would consider short course of antibiotics for                        suspected colonic ischemia.                       - Further management per primary team and vascular                        surgery.                                                                                   Attending Participation:       I was present and participated during the entire procedure, including        non-key portions.

## 2022-05-19 NOTE — PROGRESS NOTE ADULT - SUBJECTIVE AND OBJECTIVE BOX
ANESTHESIA POSTOP CHECK    82y Male POSTOP DAY 1 S/P     [ X] NO APPARENT ANESTHESIA COMPLICATIONS      Comments:

## 2022-05-19 NOTE — DISCHARGE NOTE NURSING/CASE MANAGEMENT/SOCIAL WORK - NSDCFUADDAPPT_GEN_ALL_CORE_FT
Please follow up with your primary care provider in 1-2 weeks for management and further evaluation. Additionally, your primary care provider can provide additional references to evaluate incidental lung and kidney findings. GI providers will reach out to you regarding the results of the biopsy taken during the flex sigmoidoscopy.

## 2022-05-19 NOTE — DISCHARGE NOTE NURSING/CASE MANAGEMENT/SOCIAL WORK - NSDCPEFALRISK_GEN_ALL_CORE
For information on Fall & Injury Prevention, visit: [email or web address] OR  [email or web address] OR  https://www.cdc.gov/steadi/patient.html

## 2022-05-19 NOTE — PROGRESS NOTE ADULT - ASSESSMENT
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    # Bowel wall thickening in descending colon - Abdominal pain now resolved. Suspicious for ischemic colitis given evidence of vascular disease, distribution on CTA A/P (watershed area) and gross colon mucosal findings on colonoscopy 5/18/22.   No associated diarrhea, fever, leukocytosis or lactic acidosis.   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   # CAD on DAPT  # Hyperlipidemia    Recommendations:  - can transition to PO antibiotics for short course (7-10 days total)  - low residue diet for now, then return to previous diet   - GI signing off. Please call back with questions.       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    # Bowel wall thickening in descending colon - Abdominal pain now resolved. Suspicious for ischemic colitis given evidence of vascular disease, distribution on CTA A/P (watershed area) and gross colon mucosal findings on colonoscopy 5/18/22.   No associated diarrhea, fever, leukocytosis or lactic acidosis.   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   # CAD on DAPT  # Hyperlipidemia    Recommendations:  - can transition to PO antibiotics for short course  - low residue diet for now, then return to previous diet   - follow up pending colonoscopy biopsies; patient to be called once results available/reviewed  - GI signing off. Please call back with questions.     Coral Guevara PGY-6  Gastroenterology/Hepatology Fellow  Page #[phone]   Page #[phone] 5pm-7am on weekdays, and on weekends

## 2022-05-24 PROBLEM — I25.10 ATHEROSCLEROTIC HEART DISEASE OF NATIVE CORONARY ARTERY WITHOUT ANGINA PECTORIS: Chronic | Status: ACTIVE | Noted: 2022-05-16

## 2022-05-24 PROBLEM — N40.0 BENIGN PROSTATIC HYPERPLASIA WITHOUT LOWER URINARY TRACT SYMPTOMS: Chronic | Status: ACTIVE | Noted: 2022-05-16

## 2022-05-24 PROBLEM — E78.5 HYPERLIPIDEMIA, UNSPECIFIED: Chronic | Status: ACTIVE | Noted: 2022-05-16

## 2022-05-24 PROBLEM — M10.9 GOUT, UNSPECIFIED: Chronic | Status: ACTIVE | Noted: 2022-05-16

## 2022-05-24 LAB — SURGICAL PATHOLOGY STUDY: SIGNIFICANT CHANGE UP

## 2022-05-25 ENCOUNTER — NON-APPOINTMENT (OUTPATIENT)
Age: 82
End: 2022-05-25

## 2022-05-31 DIAGNOSIS — U07.1 COVID-19: ICD-10-CM

## 2022-06-07 ENCOUNTER — NON-APPOINTMENT (OUTPATIENT)
Age: 82
End: 2022-06-07

## 2022-06-20 ENCOUNTER — APPOINTMENT (OUTPATIENT)
Dept: GASTROENTEROLOGY | Facility: CLINIC | Age: 82
End: 2022-06-20
Payer: MEDICARE

## 2022-06-20 VITALS
HEART RATE: 55 BPM | SYSTOLIC BLOOD PRESSURE: 130 MMHG | BODY MASS INDEX: 28.85 KG/M2 | DIASTOLIC BLOOD PRESSURE: 70 MMHG | OXYGEN SATURATION: 96 % | HEIGHT: 64 IN | TEMPERATURE: 96.9 F | WEIGHT: 169 LBS

## 2022-06-20 DIAGNOSIS — Z86.010 PERSONAL HISTORY OF COLONIC POLYPS: ICD-10-CM

## 2022-06-20 DIAGNOSIS — Z80.0 FAMILY HISTORY OF MALIGNANT NEOPLASM OF DIGESTIVE ORGANS: ICD-10-CM

## 2022-06-20 PROCEDURE — 99205 OFFICE O/P NEW HI 60 MIN: CPT

## 2022-06-20 NOTE — ASSESSMENT
[FreeTextEntry1] : Impression: Acute self-limited colitis most likely ischemic appears to be resolved clinically.  Mild constipation most likely secondary to low fiber diet.  Area of pathology was visualized on flex sig.  Rest of colon was visualized with last screening colonoscopy 4 years ago.  No need to repeat colonoscopy or sigmoidoscopy as long as symptoms do not recur.  Will defer further colorectal cancer screening given patient's age.\par \par Plan: Advance to high-fiber diet.  If symptoms recur we will follow-up for further evaluation.

## 2022-06-20 NOTE — HISTORY OF PRESENT ILLNESS
[Heartburn] : denies heartburn [Nausea] : denies nausea [Vomiting] : denies vomiting [Diarrhea] : denies diarrhea [Constipation] : improvement in constipation [Yellow Skin Or Eyes (Jaundice)] : denies jaundice [Abdominal Pain] : resolved abdominal pain [Abdominal Swelling] : denies abdominal swelling [Rectal Pain] : denies rectal pain [GERD] : no gastroesophageal reflux disease [Hiatus Hernia] : no hiatus hernia [Peptic Ulcer Disease] : no peptic ulcer disease [Pancreatitis] : no pancreatitis [Cholelithiasis] : no cholelithiasis [Kidney Stone] : no kidney stone [Inflammatory Bowel Disease] : no inflammatory bowel disease [Irritable Bowel Syndrome] : no irritable bowel syndrome [Diverticulitis] : no diverticulitis [Alcohol Abuse] : no alcohol abuse [Malignancy] : no malignancy [Abdominal Surgery] : no abdominal surgery [Appendectomy] : no appendectomy [Cholecystectomy] : no cholecystectomy [de-identified] : 82-year-old male with history of coronary artery disease status post single coronary stent 10 years ago on aspirin and Plavix saw PMD last month complaining of lower abdominal crampy pain and constipation.  CT was ordered which showed thickening of the distal descending colon at which point he was sent to [facility] for admission.  Patient denies diarrhea or hematochezia.  Had a flexible sigmoidoscopy on May 18 with Fleet enema prep which showed colitis in the descending colon segmental biopsies show focal nonspecific colitis most likely ischemic.  No drop in H&H, discharged on low fiber diet.  No longer having cramps or pain.  Moving bowels but perhaps less frequent and in smaller volume due to low fiber diet.\par \par Patient has a family history of colon cancer (brother age 54) and has had multiple colonoscopies most recently 4 years ago which was normal.  He believes he had polyps on one of his early colonoscopies.  Further colorectal cancer screening had been deferred.

## 2022-07-12 ENCOUNTER — APPOINTMENT (OUTPATIENT)
Dept: INTERNAL MEDICINE | Facility: CLINIC | Age: 82
End: 2022-07-12

## 2022-07-12 ENCOUNTER — LABORATORY RESULT (OUTPATIENT)
Age: 82
End: 2022-07-12

## 2022-07-12 ENCOUNTER — NON-APPOINTMENT (OUTPATIENT)
Age: 82
End: 2022-07-12

## 2022-07-12 VITALS — BODY MASS INDEX: 28.51 KG/M2 | WEIGHT: 167 LBS | HEIGHT: 64 IN

## 2022-07-12 PROCEDURE — G0439: CPT

## 2022-07-12 PROCEDURE — 93000 ELECTROCARDIOGRAM COMPLETE: CPT | Mod: 59

## 2022-07-12 PROCEDURE — 36415 COLL VENOUS BLD VENIPUNCTURE: CPT

## 2022-07-12 NOTE — REVIEW OF SYSTEMS
[Negative] : Heme/Lymph [Shortness Of Breath] : no shortness of breath [Wheezing] : no wheezing [Dyspnea on Exertion] : not dyspnea on exertion

## 2022-07-12 NOTE — HEALTH RISK ASSESSMENT
[Very Good] : ~his/her~ current health as very good [Good] : ~his/her~  mood as  good [No falls in past year] : Patient reported no falls in the past year [0] : 2) Feeling down, depressed, or hopeless: Not at all (0) [PHQ-2 Negative - No further assessment needed] : PHQ-2 Negative - No further assessment needed [Fully functional (bathing, dressing, toileting, transferring, walking, feeding)] : Fully functional (bathing, dressing, toileting, transferring, walking, feeding) [Fully functional (using the telephone, shopping, preparing meals, housekeeping, doing laundry, using] : Fully functional and needs no help or supervision to perform IADLs (using the telephone, shopping, preparing meals, housekeeping, doing laundry, using transportation, managing medications and managing finances) [[id number]] : 0 [Change in mental status noted] : No change in mental status noted [Reports changes in hearing] : Reports no changes in hearing [Reports changes in vision] : Reports no changes in vision

## 2022-07-12 NOTE — HISTORY OF PRESENT ILLNESS
[Spouse] : spouse [de-identified] : 82 year old male with h/o CAD s/p stent placement in 2016, BPH, presents for annual exam.  \par \par recently had a bout of ischemic colitis.  Seen by gastroenterologist.  No GI issues at this time.\par \par Noted to have pulmonary interstitial fibrosis on CT abdomen pelvis at that time.  Has not seen pulmonologist, Dr. Albaro Linn.  States went to [facility] including labs which showed slightly elevated inflammatory marker was recommended to see dermatologist.  No results to review at this time.  Denies any breathing issues.  Was down at [facility] around 9/11.  \par \par Has been compliant with medications, no SE\par \par Continues to stay active swimming in his pool and walking daily.  no associated soboe or cp.\par \par \par \par \par \par

## 2022-07-12 NOTE — ASSESSMENT
[FreeTextEntry1] : 82 year old male with h/o CAD s/p stent placement in 2016, BPH, presents for annual exam.  \par \par s/p ischemic colitis\par -cont maximal asa, plavix and statin \par \par elevated inflam marker, unsure iof which was elevated?\par -rheum referral \par -will get results from pulmonologist\par \par CAD, s/p stent placement in 2016, cardiology-Dr. Parra received [facility].\par -Continue meds as directed\par \par BPH.-stable urologist, Dr. Leory Candelario, \par -cont meds as directed \par \par Gout, on allopurinol. Last attack 17 years ago. \par -cont allopurinol as directed \par \par Annual \par -Advise to get yearly Flu shot\par -Advise Yearly Skin cancer screening with Dermatologist \par -Advise Yearly Eye exam with Ophthalmologist\par -Advise Yearly Dental exam\par -Educated of the importance of Healthy diet, such as Mediterranean Diet and Exercise, such as walking >20 minutes a day and increasing gradually as tolerated\par \par \par \par \par \par \par \par

## 2022-07-18 ENCOUNTER — CLINICAL ADVICE (OUTPATIENT)
Age: 82
End: 2022-07-18

## 2022-07-18 LAB
ALBUMIN SERPL ELPH-MCNC: 4.2 G/DL
ALP BLD-CCNC: 68 U/L
ALT SERPL-CCNC: 21 U/L
ANA PAT FLD IF-IMP: ABNORMAL
ANACR T: ABNORMAL
ANION GAP SERPL CALC-SCNC: 10 MMOL/L
APPEARANCE: CLEAR
AST SERPL-CCNC: 28 U/L
BACTERIA: NEGATIVE
BASOPHILS # BLD AUTO: 0.03 K/UL
BASOPHILS NFR BLD AUTO: 0.4 %
BILIRUB DIRECT SERPL-MCNC: 0.2 MG/DL
BILIRUB INDIRECT SERPL-MCNC: 0.4 MG/DL
BILIRUB SERPL-MCNC: 0.6 MG/DL
BILIRUBIN URINE: NEGATIVE
BLOOD URINE: NEGATIVE
BUN SERPL-MCNC: 23 MG/DL
CALCIUM SERPL-MCNC: 8.9 MG/DL
CHLORIDE SERPL-SCNC: 105 MMOL/L
CHOLEST SERPL-MCNC: 113 MG/DL
CO2 SERPL-SCNC: 27 MMOL/L
COLOR: NORMAL
CREAT SERPL-MCNC: 0.97 MG/DL
CRP SERPL-MCNC: <3 MG/L
EGFR: 77 ML/MIN/1.73M2
EOSINOPHIL # BLD AUTO: 0.26 K/UL
EOSINOPHIL NFR BLD AUTO: 3.5 %
ERYTHROCYTE [SEDIMENTATION RATE] IN BLOOD BY WESTERGREN METHOD: 10 MM/HR
ESTIMATED AVERAGE GLUCOSE: 117 MG/DL
GLUCOSE QUALITATIVE U: NEGATIVE
GLUCOSE SERPL-MCNC: 97 MG/DL
HBA1C MFR BLD HPLC: 5.7 %
HCT VFR BLD CALC: 40.3 %
HDLC SERPL-MCNC: 68 MG/DL
HGB BLD-MCNC: 13.3 G/DL
HYALINE CASTS: 0 /LPF
IMM GRANULOCYTES NFR BLD AUTO: 0.3 %
KETONES URINE: NEGATIVE
LDLC SERPL CALC-MCNC: 31 MG/DL
LEUKOCYTE ESTERASE URINE: NEGATIVE
LYMPHOCYTES # BLD AUTO: 1.34 K/UL
LYMPHOCYTES NFR BLD AUTO: 17.8 %
MAN DIFF?: NORMAL
MCHC RBC-ENTMCNC: 29.6 PG
MCHC RBC-ENTMCNC: 33 GM/DL
MCV RBC AUTO: 89.8 FL
MICROSCOPIC-UA: NORMAL
MONOCYTES # BLD AUTO: 0.64 K/UL
MONOCYTES NFR BLD AUTO: 8.5 %
NEUTROPHILS # BLD AUTO: 5.24 K/UL
NEUTROPHILS NFR BLD AUTO: 69.5 %
NITRITE URINE: NEGATIVE
NONHDLC SERPL-MCNC: 45 MG/DL
PH URINE: 6
PLATELET # BLD AUTO: 310 K/UL
POTASSIUM SERPL-SCNC: 4.2 MMOL/L
PROT SERPL-MCNC: 6.3 G/DL
PROTEIN URINE: NEGATIVE
RBC # BLD: 4.49 M/UL
RBC # FLD: 13.5 %
RED BLOOD CELLS URINE: 1 /HPF
SODIUM SERPL-SCNC: 142 MMOL/L
SPECIFIC GRAVITY URINE: 1.02
SQUAMOUS EPITHELIAL CELLS: 0 /HPF
TRIGL SERPL-MCNC: 70 MG/DL
TSH SERPL-ACNC: 3 UIU/ML
UROBILINOGEN URINE: NORMAL
WBC # FLD AUTO: 7.53 K/UL
WHITE BLOOD CELLS URINE: 1 /HPF

## 2022-09-25 ENCOUNTER — NON-APPOINTMENT (OUTPATIENT)
Age: 82
End: 2022-09-25

## 2022-09-29 ENCOUNTER — RX RENEWAL (OUTPATIENT)
Age: 82
End: 2022-09-29

## 2022-11-17 ENCOUNTER — RX RENEWAL (OUTPATIENT)
Age: 82
End: 2022-11-17

## 2022-12-05 ENCOUNTER — RX RENEWAL (OUTPATIENT)
Age: 82
End: 2022-12-05

## 2022-12-05 RX ORDER — CLOPIDOGREL BISULFATE 75 MG/1
75 TABLET, FILM COATED ORAL
Qty: 45 | Refills: 3 | Status: ACTIVE | COMMUNITY
Start: 2022-12-05 | End: 1900-01-01

## 2022-12-18 ENCOUNTER — RX RENEWAL (OUTPATIENT)
Age: 82
End: 2022-12-18

## 2022-12-28 ENCOUNTER — NON-APPOINTMENT (OUTPATIENT)
Age: 82
End: 2022-12-28

## 2022-12-29 ENCOUNTER — NON-APPOINTMENT (OUTPATIENT)
Age: 82
End: 2022-12-29

## 2023-01-03 ENCOUNTER — APPOINTMENT (OUTPATIENT)
Dept: INTERNAL MEDICINE | Facility: CLINIC | Age: 83
End: 2023-01-03
Payer: MEDICARE

## 2023-01-03 ENCOUNTER — NON-APPOINTMENT (OUTPATIENT)
Age: 83
End: 2023-01-03

## 2023-01-03 VITALS — DIASTOLIC BLOOD PRESSURE: 82 MMHG | OXYGEN SATURATION: 98 % | SYSTOLIC BLOOD PRESSURE: 164 MMHG | HEART RATE: 74 BPM

## 2023-01-03 VITALS — WEIGHT: 159 LBS | BODY MASS INDEX: 27.14 KG/M2 | HEIGHT: 64 IN

## 2023-01-03 DIAGNOSIS — K21.9 GASTRO-ESOPHAGEAL REFLUX DISEASE W/OUT ESOPHAGITIS: ICD-10-CM

## 2023-01-03 DIAGNOSIS — Z87.19 PERSONAL HISTORY OF OTHER DISEASES OF THE DIGESTIVE SYSTEM: ICD-10-CM

## 2023-01-03 PROCEDURE — 99214 OFFICE O/P EST MOD 30 MIN: CPT | Mod: 25

## 2023-01-03 PROCEDURE — 93000 ELECTROCARDIOGRAM COMPLETE: CPT | Mod: 59

## 2023-01-03 PROCEDURE — 36415 COLL VENOUS BLD VENIPUNCTURE: CPT

## 2023-01-03 RX ORDER — FAMOTIDINE 20 MG/1
20 TABLET, FILM COATED ORAL
Qty: 60 | Refills: 0 | Status: ACTIVE | COMMUNITY
Start: 2023-01-03 | End: 1900-01-01

## 2023-01-03 NOTE — ASSESSMENT
[FreeTextEntry1] : 82 year old male with h/o CAD s/p stent placement in 2016, BPH, presents\par \par bout of epigastric pain with nausea, vomiting with dark stools.  symptoms resolved. on asa/plavix.  doesn’t recall having endoscopy in the past.  Concern for gastritis, upper GI bleed\par -Has appointment with gastro next week\par -Start Pepcid, yet (will avoid PPI is on Plavix)\par -will check labs \par -monitor for reoccurrence, \par \par Weight loss, 20 pounds in the last year\par -Follow-up with GI\par -We will check labs\par -Advised to calorie count at home, if noted skipping meals have protein supplement such as Ensure, boost\par \par s/p ischemic colitis\par -cont asa, plavix and statin \par \par +CESAR, +Krystal-1\par -rheum referral appreciated follow-up upcoming\par -will get results from pulmonologist\par \par CAD, s/p stent placement in 2016, cardiology-Dr. Parra received [facility].\par -Continue meds as directed\par \par BPH.-stable urologist, Dr. Leroy Candelario, \par -cont meds as directed \par \par Gout, on allopurinol. Last attack 17 years ago. \par -cont allopurinol as directed \par \par Please note that 36 minutes time spent in care with this patient which includes pre-visit preparation such as reviewed pertinent labs, imaging, and specialists consultation, in-person visit, post-visit documentation and discussion.\par \par \par \par \par \par \par

## 2023-01-03 NOTE — HISTORY OF PRESENT ILLNESS
[de-identified] : 82 year old male with h/o CAD s/p stent placement in 2016, BPH, presents for follow-up \par \par s/p ischemic colitis in 6/2022.  Seen by gastroenterologist. \par had a bout of nausea/vomiting about 2 weeks ago.  that day did experience dark stool for that initial day.  \par was seen at urgent care, 3 days ago was given sulfurate with some relief.  back to normal diet.  \par no diarrhea, constipation, fever/chills, \par \par +unintentional weight loss over the last year.  states was eliminating some foods after bout of colitis and Lent but noted to have 20 lbs weight loss \par \par +CESAR, Krystal-1 antibody.  seen by rheumatologist,  undergoing evaluation for scleroderma.  has appointment upcoming \par \par \par Continues to stay active swimming in his pool and walking daily.  no associated soboe or cp.

## 2023-01-03 NOTE — PHYSICAL EXAM
[Normal] : normal rate, regular rhythm, normal S1 and S2 and no murmur heard [No Varicosities] : no varicosities [Pedal Pulses Present] : the pedal pulses are present [No Edema] : there was no peripheral edema [No Extremity Clubbing/Cyanosis] : no extremity clubbing/cyanosis [Soft] : abdomen soft [Non Tender] : non-tender [Non-distended] : non-distended [Normal Posterior Cervical Nodes] : no posterior cervical lymphadenopathy [Normal Anterior Cervical Nodes] : no anterior cervical lymphadenopathy [No Rash] : no rash [No Focal Deficits] : no focal deficits [Normal Affect] : the affect was normal [Normal Mood] : the mood was normal [de-identified] : not performed as wearing mask due to covid precautions

## 2023-01-05 ENCOUNTER — CLINICAL ADVICE (OUTPATIENT)
Age: 83
End: 2023-01-05

## 2023-01-05 LAB
AFP-TM SERPL-MCNC: 2.4 NG/ML
ALBUMIN SERPL ELPH-MCNC: 4 G/DL
ALP BLD-CCNC: 75 U/L
ALT SERPL-CCNC: 30 U/L
AMYLASE/CREAT SERPL: 97 U/L
AMYLASE/CREAT SERPL: 97 U/L
ANION GAP SERPL CALC-SCNC: 11 MMOL/L
AST SERPL-CCNC: 29 U/L
BASOPHILS # BLD AUTO: 0.05 K/UL
BASOPHILS NFR BLD AUTO: 0.6 %
BILIRUB DIRECT SERPL-MCNC: 0.1 MG/DL
BILIRUB INDIRECT SERPL-MCNC: 0.3 MG/DL
BILIRUB SERPL-MCNC: 0.4 MG/DL
BUN SERPL-MCNC: 25 MG/DL
CALCIUM SERPL-MCNC: 9.2 MG/DL
CANCER AG19-9 SERPL-ACNC: 11 U/ML
CANCER AG27-29 SERPL-ACNC: 40.6 U/ML
CHLORIDE SERPL-SCNC: 104 MMOL/L
CHOLEST SERPL-MCNC: 86 MG/DL
CO2 SERPL-SCNC: 27 MMOL/L
CREAT SERPL-MCNC: 1.04 MG/DL
EGFR: 71 ML/MIN/1.73M2
EOSINOPHIL # BLD AUTO: 0.18 K/UL
EOSINOPHIL NFR BLD AUTO: 2.3 %
ESTIMATED AVERAGE GLUCOSE: 117 MG/DL
GLUCOSE SERPL-MCNC: 96 MG/DL
HBA1C MFR BLD HPLC: 5.7 %
HCT VFR BLD CALC: 39.5 %
HDLC SERPL-MCNC: 35 MG/DL
HGB BLD-MCNC: 13.1 G/DL
IMM GRANULOCYTES NFR BLD AUTO: 0.1 %
LDLC SERPL CALC-MCNC: 37 MG/DL
LPL SERPL-CCNC: 48 U/L
LYMPHOCYTES # BLD AUTO: 1.42 K/UL
LYMPHOCYTES NFR BLD AUTO: 17.9 %
MAN DIFF?: NORMAL
MCHC RBC-ENTMCNC: 29.5 PG
MCHC RBC-ENTMCNC: 33.2 GM/DL
MCV RBC AUTO: 89 FL
MONOCYTES # BLD AUTO: 0.48 K/UL
MONOCYTES NFR BLD AUTO: 6 %
NEUTROPHILS # BLD AUTO: 5.8 K/UL
NEUTROPHILS NFR BLD AUTO: 73.1 %
NONHDLC SERPL-MCNC: 50 MG/DL
PLATELET # BLD AUTO: 427 K/UL
POTASSIUM SERPL-SCNC: 4.5 MMOL/L
PROT SERPL-MCNC: 6.7 G/DL
RBC # BLD: 4.44 M/UL
RBC # FLD: 12.1 %
SODIUM SERPL-SCNC: 142 MMOL/L
TRIGL SERPL-MCNC: 68 MG/DL
TSH SERPL-ACNC: 1.64 UIU/ML
WBC # FLD AUTO: 7.94 K/UL

## 2023-01-09 ENCOUNTER — APPOINTMENT (OUTPATIENT)
Dept: GASTROENTEROLOGY | Facility: CLINIC | Age: 83
End: 2023-01-09
Payer: MEDICARE

## 2023-01-09 VITALS
SYSTOLIC BLOOD PRESSURE: 110 MMHG | HEIGHT: 66 IN | WEIGHT: 158 LBS | HEART RATE: 53 BPM | BODY MASS INDEX: 25.39 KG/M2 | DIASTOLIC BLOOD PRESSURE: 76 MMHG | TEMPERATURE: 96.9 F | OXYGEN SATURATION: 97 %

## 2023-01-09 DIAGNOSIS — R12 HEARTBURN: ICD-10-CM

## 2023-01-09 DIAGNOSIS — R63.4 ABNORMAL WEIGHT LOSS: ICD-10-CM

## 2023-01-09 DIAGNOSIS — R10.13 EPIGASTRIC PAIN: ICD-10-CM

## 2023-01-09 DIAGNOSIS — K21.00 GASTRO-ESOPHAGEAL REFLUX DISEASE WITH ESOPHAGITIS, WITHOUT BLEEDING: ICD-10-CM

## 2023-01-09 PROCEDURE — 99213 OFFICE O/P EST LOW 20 MIN: CPT

## 2023-01-09 RX ORDER — SUCRALFATE 1 G/1
1 TABLET ORAL
Refills: 0 | Status: DISCONTINUED | COMMUNITY

## 2023-01-09 NOTE — ASSESSMENT
[FreeTextEntry1] : Impression: Exacerbation of GERD most likely secondary to episode of gastroenteritis with vomiting.  Appears to be improving.  Decreased appetite and weight loss over the past 6 months.  Epigastric tenderness suggest some functional component to symptoms.\par \par Plan: I recommend EGD for further evaluation.  Patient is reluctant to undergo procedure currently.  In light of recent negative CAT scan we will alternatively opt to treat for esophagitis and perform EGD if no improvement over the next couple of weeks.  We will begin omeprazole 40 mg every morning and continue the famotidine at night only.  We can DC the sucralfate.  Patient will call for follow-up in 2 weeks.  If symptoms persist will perform EGD

## 2023-01-09 NOTE — HISTORY OF PRESENT ILLNESS
[FreeTextEntry1] : 3 weeks ago the patient experienced nausea with vomiting as well as transient diarrhea all of which resolved.  He continues to have burning in chest right after eating.  No abdominal pain.  No dysphagia.  Has been on famotidine 20 mg twice daily for many years because of GERD symptoms.  He does not recall ever having had an EGD.  States that for the past 6 months he has been eating less, appetite has been less, and has lost about 30 pounds.  Had a CAT scan earlier this year when he had the issue with presumed ischemic colitis which did not show any esophageal or gastric pathology.  Went to urgent care last week and was prescribed sucralfate tablets twice daily.  Symptoms seem to have been improving.

## 2023-01-24 ENCOUNTER — APPOINTMENT (OUTPATIENT)
Dept: GASTROENTEROLOGY | Facility: CLINIC | Age: 83
End: 2023-01-24

## 2023-03-10 ENCOUNTER — APPOINTMENT (OUTPATIENT)
Dept: INTERNAL MEDICINE | Facility: CLINIC | Age: 83
End: 2023-03-10
Payer: MEDICARE

## 2023-03-10 ENCOUNTER — APPOINTMENT (OUTPATIENT)
Dept: INTERNAL MEDICINE | Facility: CLINIC | Age: 83
End: 2023-03-10

## 2023-03-10 PROCEDURE — 99442: CPT | Mod: 95

## 2023-03-10 RX ORDER — FLUTICASONE PROPIONATE 50 UG/1
50 SPRAY, METERED NASAL TWICE DAILY
Qty: 1 | Refills: 1 | Status: ACTIVE | COMMUNITY
Start: 2023-03-10 | End: 1900-01-01

## 2023-04-25 ENCOUNTER — APPOINTMENT (OUTPATIENT)
Dept: INTERNAL MEDICINE | Facility: CLINIC | Age: 83
End: 2023-04-25
Payer: MEDICARE

## 2023-04-25 VITALS
SYSTOLIC BLOOD PRESSURE: 160 MMHG | DIASTOLIC BLOOD PRESSURE: 75 MMHG | OXYGEN SATURATION: 97 % | BODY MASS INDEX: 26.36 KG/M2 | HEIGHT: 66 IN | WEIGHT: 164 LBS | HEART RATE: 58 BPM

## 2023-04-25 DIAGNOSIS — R05.9 COUGH, UNSPECIFIED: ICD-10-CM

## 2023-04-25 DIAGNOSIS — M25.552 PAIN IN RIGHT HIP: ICD-10-CM

## 2023-04-25 DIAGNOSIS — R05.3 CHRONIC COUGH: ICD-10-CM

## 2023-04-25 DIAGNOSIS — M25.551 PAIN IN RIGHT HIP: ICD-10-CM

## 2023-04-25 DIAGNOSIS — K59.00 CONSTIPATION, UNSPECIFIED: ICD-10-CM

## 2023-04-25 PROCEDURE — 99214 OFFICE O/P EST MOD 30 MIN: CPT | Mod: 25

## 2023-04-25 PROCEDURE — 36415 COLL VENOUS BLD VENIPUNCTURE: CPT

## 2023-04-26 PROBLEM — R05.9 COUGH: Noted: 2021-11-01

## 2023-04-26 PROBLEM — R05.3 REFRACTORY CHRONIC COUGH: Status: ACTIVE | Noted: 2023-04-26

## 2023-04-26 LAB
ALBUMIN SERPL ELPH-MCNC: 4.4 G/DL
ALP BLD-CCNC: 93 U/L
ALT SERPL-CCNC: 21 U/L
ANION GAP SERPL CALC-SCNC: 9 MMOL/L
AST SERPL-CCNC: 29 U/L
BASOPHILS # BLD AUTO: 0.08 K/UL
BASOPHILS NFR BLD AUTO: 0.9 %
BILIRUB DIRECT SERPL-MCNC: 0.2 MG/DL
BILIRUB INDIRECT SERPL-MCNC: 0.3 MG/DL
BILIRUB SERPL-MCNC: 0.4 MG/DL
BUN SERPL-MCNC: 26 MG/DL
CALCIUM SERPL-MCNC: 9.4 MG/DL
CHLORIDE SERPL-SCNC: 102 MMOL/L
CHOLEST SERPL-MCNC: 116 MG/DL
CO2 SERPL-SCNC: 28 MMOL/L
CREAT SERPL-MCNC: 1.18 MG/DL
EGFR: 61 ML/MIN/1.73M2
EOSINOPHIL # BLD AUTO: 0.47 K/UL
EOSINOPHIL NFR BLD AUTO: 5.4 %
ESTIMATED AVERAGE GLUCOSE: 123 MG/DL
GLUCOSE SERPL-MCNC: 87 MG/DL
HBA1C MFR BLD HPLC: 5.9 %
HCT VFR BLD CALC: 44.7 %
HDLC SERPL-MCNC: 63 MG/DL
HGB BLD-MCNC: 14.5 G/DL
IMM GRANULOCYTES NFR BLD AUTO: 0.2 %
LDLC SERPL CALC-MCNC: 41 MG/DL
LYMPHOCYTES # BLD AUTO: 1.6 K/UL
LYMPHOCYTES NFR BLD AUTO: 18.2 %
MAN DIFF?: NORMAL
MCHC RBC-ENTMCNC: 29.1 PG
MCHC RBC-ENTMCNC: 32.4 GM/DL
MCV RBC AUTO: 89.6 FL
MONOCYTES # BLD AUTO: 0.82 K/UL
MONOCYTES NFR BLD AUTO: 9.4 %
NEUTROPHILS # BLD AUTO: 5.78 K/UL
NEUTROPHILS NFR BLD AUTO: 65.9 %
NONHDLC SERPL-MCNC: 53 MG/DL
PLATELET # BLD AUTO: 374 K/UL
POTASSIUM SERPL-SCNC: 5.3 MMOL/L
PROT SERPL-MCNC: 6.8 G/DL
RBC # BLD: 4.99 M/UL
RBC # FLD: 13.5 %
SODIUM SERPL-SCNC: 139 MMOL/L
TRIGL SERPL-MCNC: 60 MG/DL
TSH SERPL-ACNC: 3.87 UIU/ML
WBC # FLD AUTO: 8.77 K/UL

## 2023-04-26 NOTE — PHYSICAL EXAM
[Normal] : normal rate, regular rhythm, normal S1 and S2 and no murmur heard [Pedal Pulses Present] : the pedal pulses are present [No Edema] : there was no peripheral edema [No Extremity Clubbing/Cyanosis] : no extremity clubbing/cyanosis [Soft] : abdomen soft [Non Tender] : non-tender [Non-distended] : non-distended

## 2023-04-26 NOTE — HISTORY OF PRESENT ILLNESS
[de-identified] : Cardio- Dr. Parra\par Pulm- Dr. Linn\par Rheum- Dr. Ngo\par \par 82 year old male with h/o CAD s/p stent placement in 2016, BPH, presents for follow-up.  \par \par Enjoyed his 83th birthday yesterday.  Plan on going to [place] later this week.\par \par overall is doing well.  no acute issues. \par \par still having intermittent dry cough for several years.  has been treated with abx for possible infection with no major change.  seen by pulmonologist, Dr. Linn.  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  seen by rheumatologist,  undergoing evaluation for scleroderma.  has appointment upcoming \par Continues to stay active swimming in his pool and walking daily.  no associated soboe or cp.

## 2023-04-26 NOTE — ASSESSMENT
[FreeTextEntry1] : 82 year old male with h/o CAD s/p stent placement in 2016, BPH, presents\par \par Chronic cough for several years with no known etiology.  Can be bradykinin induced from being on ACE inhibitor for several years\par -stop lisinopril, start losartan and monitor for resolution of symptoms\par \par Weight loss, 20 pounds last year following bouts of colitis, healthier diet.  Weight has been stable today\par -We will check labs\par -Advised to calorie count at home, if noted skipping meals have protein supplement such as Ensure, boost\par \par s/p ischemic colitis\par -cont asa, plavix and statin \par \par +CESAR, +Krystal-1\par -rheum referral appreciated follow-up upcoming\par -will get results from pulmonologist\par \par CAD, s/p stent placement in 2016, cardiology-Dr. Parra received [facility].\par -Continue meds as directed\par \par BPH.-stable urologist, Dr. Leroy Candelario, \par -cont meds as directed \par \par Gout, on allopurinol. Last attack 17 years ago. \par -cont allopurinol as directed \par \par Please note that 36 minutes time spent in care with this patient which includes pre-visit preparation such as reviewed pertinent labs, imaging, and specialists consultation, in-person visit, post-visit documentation and discussion.\par \par \par \par \par \par \par

## 2023-04-26 NOTE — REVIEW OF SYSTEMS
[Shortness Of Breath] : no shortness of breath [Wheezing] : no wheezing [Dyspnea on Exertion] : not dyspnea on exertion [Negative] : Heme/Lymph

## 2023-07-10 ENCOUNTER — RX RENEWAL (OUTPATIENT)
Age: 83
End: 2023-07-10

## 2023-09-12 ENCOUNTER — NON-APPOINTMENT (OUTPATIENT)
Age: 83
End: 2023-09-12

## 2023-09-12 ENCOUNTER — APPOINTMENT (OUTPATIENT)
Dept: INTERNAL MEDICINE | Facility: CLINIC | Age: 83
End: 2023-09-12
Payer: MEDICARE

## 2023-09-12 VITALS
DIASTOLIC BLOOD PRESSURE: 70 MMHG | HEIGHT: 66 IN | WEIGHT: 163 LBS | HEART RATE: 62 BPM | SYSTOLIC BLOOD PRESSURE: 140 MMHG | BODY MASS INDEX: 26.2 KG/M2

## 2023-09-12 DIAGNOSIS — H00.012 HORDEOLUM EXTERNUM RIGHT LOWER EYELID: ICD-10-CM

## 2023-09-12 DIAGNOSIS — Z23 ENCOUNTER FOR IMMUNIZATION: ICD-10-CM

## 2023-09-12 DIAGNOSIS — Z00.00 ENCOUNTER FOR GENERAL ADULT MEDICAL EXAMINATION W/OUT ABNORMAL FINDINGS: ICD-10-CM

## 2023-09-12 PROCEDURE — G0008: CPT

## 2023-09-12 PROCEDURE — 93000 ELECTROCARDIOGRAM COMPLETE: CPT

## 2023-09-12 PROCEDURE — 90662 IIV NO PRSV INCREASED AG IM: CPT

## 2023-09-12 PROCEDURE — 36415 COLL VENOUS BLD VENIPUNCTURE: CPT

## 2023-09-12 PROCEDURE — G0439: CPT

## 2023-09-12 RX ORDER — AZITHROMYCIN 250 MG/1
250 TABLET, FILM COATED ORAL
Qty: 1 | Refills: 0 | Status: DISCONTINUED | COMMUNITY
Start: 2023-03-10 | End: 2023-09-12

## 2023-09-12 RX ORDER — ERYTHROMYCIN 5 MG/G
5 OINTMENT OPHTHALMIC
Qty: 1 | Refills: 0 | Status: ACTIVE | COMMUNITY
Start: 2023-09-12 | End: 1900-01-01

## 2023-09-12 RX ORDER — LISINOPRIL 5 MG/1
5 TABLET ORAL DAILY
Qty: 90 | Refills: 3 | Status: DISCONTINUED | COMMUNITY
Start: 2022-12-18 | End: 2023-09-12

## 2023-09-12 RX ORDER — AZELASTINE HYDROCHLORIDE 137 UG/1
137 SPRAY, METERED NASAL
Qty: 1 | Refills: 3 | Status: DISCONTINUED | COMMUNITY
Start: 2021-12-15 | End: 2023-09-12

## 2023-09-13 ENCOUNTER — CLINICAL ADVICE (OUTPATIENT)
Age: 83
End: 2023-09-13

## 2023-09-13 LAB
ALBUMIN SERPL ELPH-MCNC: 4.4 G/DL
ALP BLD-CCNC: 90 U/L
ALT SERPL-CCNC: 22 U/L
ANION GAP SERPL CALC-SCNC: 10 MMOL/L
APPEARANCE: CLEAR
AST SERPL-CCNC: 27 U/L
BACTERIA: NEGATIVE /HPF
BASOPHILS # BLD AUTO: 0.06 K/UL
BASOPHILS NFR BLD AUTO: 0.7 %
BILIRUB DIRECT SERPL-MCNC: 0.2 MG/DL
BILIRUB INDIRECT SERPL-MCNC: 0.4 MG/DL
BILIRUB SERPL-MCNC: 0.6 MG/DL
BILIRUBIN URINE: NEGATIVE
BLOOD URINE: NEGATIVE
BUN SERPL-MCNC: 22 MG/DL
CALCIUM SERPL-MCNC: 9.3 MG/DL
CAST: 0 /LPF
CHLORIDE SERPL-SCNC: 105 MMOL/L
CHOLEST SERPL-MCNC: 108 MG/DL
CO2 SERPL-SCNC: 28 MMOL/L
COLOR: YELLOW
CREAT SERPL-MCNC: 1.2 MG/DL
EGFR: 60 ML/MIN/1.73M2
EOSINOPHIL # BLD AUTO: 0.36 K/UL
EOSINOPHIL NFR BLD AUTO: 4.2 %
EPITHELIAL CELLS: 0 /HPF
ESTIMATED AVERAGE GLUCOSE: 123 MG/DL
FERRITIN SERPL-MCNC: 220 NG/ML
FOLATE SERPL-MCNC: 15 NG/ML
GLUCOSE QUALITATIVE U: NEGATIVE MG/DL
GLUCOSE SERPL-MCNC: 99 MG/DL
HBA1C MFR BLD HPLC: 5.9 %
HCT VFR BLD CALC: 42.4 %
HDLC SERPL-MCNC: 56 MG/DL
HGB BLD-MCNC: 14.4 G/DL
IMM GRANULOCYTES NFR BLD AUTO: 0.4 %
IRON SATN MFR SERPL: 29 %
IRON SERPL-MCNC: 81 UG/DL
KETONES URINE: NEGATIVE MG/DL
LDLC SERPL CALC-MCNC: 35 MG/DL
LEUKOCYTE ESTERASE URINE: NEGATIVE
LYMPHOCYTES # BLD AUTO: 1.51 K/UL
LYMPHOCYTES NFR BLD AUTO: 17.6 %
MAN DIFF?: NORMAL
MCHC RBC-ENTMCNC: 30.8 PG
MCHC RBC-ENTMCNC: 34 GM/DL
MCV RBC AUTO: 90.8 FL
MICROSCOPIC-UA: NORMAL
MONOCYTES # BLD AUTO: 0.59 K/UL
MONOCYTES NFR BLD AUTO: 6.9 %
NEUTROPHILS # BLD AUTO: 6.02 K/UL
NEUTROPHILS NFR BLD AUTO: 70.2 %
NITRITE URINE: NEGATIVE
NONHDLC SERPL-MCNC: 52 MG/DL
PH URINE: 6
PLATELET # BLD AUTO: 333 K/UL
POTASSIUM SERPL-SCNC: 4.8 MMOL/L
PROT SERPL-MCNC: 6.7 G/DL
PROTEIN URINE: NEGATIVE MG/DL
RBC # BLD: 4.67 M/UL
RBC # FLD: 12.8 %
RED BLOOD CELLS URINE: 0 /HPF
SODIUM SERPL-SCNC: 142 MMOL/L
SPECIFIC GRAVITY URINE: 1.02
TIBC SERPL-MCNC: 274 UG/DL
TRIGL SERPL-MCNC: 84 MG/DL
TSH SERPL-ACNC: 3.59 UIU/ML
UIBC SERPL-MCNC: 194 UG/DL
URATE SERPL-MCNC: 4 MG/DL
UROBILINOGEN URINE: 0.2 MG/DL
VIT B12 SERPL-MCNC: 513 PG/ML
WBC # FLD AUTO: 8.57 K/UL
WHITE BLOOD CELLS URINE: 0 /HPF

## 2023-10-02 ENCOUNTER — RX RENEWAL (OUTPATIENT)
Age: 83
End: 2023-10-02

## 2023-10-02 RX ORDER — ROSUVASTATIN CALCIUM 40 MG/1
40 TABLET, FILM COATED ORAL DAILY
Qty: 90 | Refills: 3 | Status: ACTIVE | COMMUNITY
Start: 2022-09-29 | End: 1900-01-01

## 2023-10-03 NOTE — H&P ADULT - PROBLEM SELECTOR PLAN 3
Continue allopurinol 100 mg daily. CTAP w/oral contrast incidentally showing Bilateral renal cysts measuring up to 6.1 cm arising from the lateral midpole of the right kidney. Otherwise, within normal limits.  Outpatient follow up with PCP [facility]: Mirvaso is a topical medication which can decrease superficial blood flow where applied. Side effects are uncommon and include stinging, redness and allergic reactions.

## 2023-11-16 ENCOUNTER — RX RENEWAL (OUTPATIENT)
Age: 83
End: 2023-11-16

## 2023-11-16 RX ORDER — EZETIMIBE 10 MG/1
10 TABLET ORAL DAILY
Qty: 90 | Refills: 3 | Status: ACTIVE | COMMUNITY
Start: 2022-11-17 | End: 1900-01-01

## 2023-11-16 RX ORDER — FAMOTIDINE 20 MG/1
20 TABLET, FILM COATED ORAL
Qty: 180 | Refills: 3 | Status: ACTIVE | COMMUNITY
Start: 2022-11-17 | End: 1900-01-01

## 2023-11-27 ENCOUNTER — RX RENEWAL (OUTPATIENT)
Age: 83
End: 2023-11-27

## 2023-11-27 RX ORDER — METOPROLOL TARTRATE 50 MG/1
50 TABLET, FILM COATED ORAL DAILY
Qty: 90 | Refills: 3 | Status: ACTIVE | COMMUNITY
Start: 2022-11-17 | End: 1900-01-01

## 2023-11-28 RX ORDER — AZITHROMYCIN 250 MG/1
250 TABLET, FILM COATED ORAL
Qty: 1 | Refills: 0 | Status: ACTIVE | COMMUNITY
Start: 2023-11-28 | End: 1900-01-01

## 2024-01-04 ENCOUNTER — NON-APPOINTMENT (OUTPATIENT)
Age: 84
End: 2024-01-04

## 2024-03-06 ENCOUNTER — APPOINTMENT (OUTPATIENT)
Dept: INTERNAL MEDICINE | Facility: CLINIC | Age: 84
End: 2024-03-06
Payer: MEDICARE

## 2024-03-06 VITALS
BODY MASS INDEX: 27.32 KG/M2 | HEART RATE: 54 BPM | DIASTOLIC BLOOD PRESSURE: 84 MMHG | OXYGEN SATURATION: 97 % | WEIGHT: 170 LBS | HEIGHT: 66 IN | SYSTOLIC BLOOD PRESSURE: 152 MMHG

## 2024-03-06 DIAGNOSIS — N40.0 BENIGN PROSTATIC HYPERPLASIA WITHOUT LOWER URINARY TRACT SYMPMS: ICD-10-CM

## 2024-03-06 DIAGNOSIS — M10.9 GOUT, UNSPECIFIED: ICD-10-CM

## 2024-03-06 DIAGNOSIS — R76.8 OTHER SPECIFIED ABNORMAL IMMUNOLOGICAL FINDINGS IN SERUM: ICD-10-CM

## 2024-03-06 DIAGNOSIS — R09.82 POSTNASAL DRIP: ICD-10-CM

## 2024-03-06 DIAGNOSIS — I25.10 ATHEROSCLEROTIC HEART DISEASE OF NATIVE CORONARY ARTERY W/OUT ANGINA PECTORIS: ICD-10-CM

## 2024-03-06 PROCEDURE — 36415 COLL VENOUS BLD VENIPUNCTURE: CPT

## 2024-03-06 PROCEDURE — G2211 COMPLEX E/M VISIT ADD ON: CPT

## 2024-03-06 PROCEDURE — 99214 OFFICE O/P EST MOD 30 MIN: CPT

## 2024-03-06 NOTE — ASSESSMENT
[FreeTextEntry1] : //  BPH, status post cystoscopy.  Awaiting results.  Has appointment with the urology upcoming -Continue alfuzosin, Proscar -Follow-up as per urology  s/p ischemic colitis-resolved  -cont asa, plavix and statin   +CESAR, +Krystal-1.  mild interstisal disease noted in 2022. -repeat ct chest  -repeat levels -rheum referral appreciated, no further intervention needed as per patient -will get results from pulmonologist  CAD, s/p stent placement in 2016, cardiology-Dr. Parra received [facility]. -Continue meds as directed  BPH.-stable urologist, Dr. Leroy Candelario,  -cont meds as directed   Gout, on allopurinol. Last attack 17 years ago.  -cont allopurinol as directed  -check uric acid

## 2024-03-06 NOTE — HISTORY OF PRESENT ILLNESS
[de-identified] : Cardio- Dr. Parra Pulm- Dr. Linn Rheum- Dr. Ngo  83 year old male with h/o CAD s/p stent placement in 2016, BPH, presents for follow-up  overall is doing well.  no acute issues.   hasn't gotten repeat CT chest for 2 years.  dxed with ILD at that time.  was seeing Dr. Albaro Linn   +CESAR, Krystal-1 antibody.  seen by rheumatologist, workup was grossly unremarkable at that time but never followed up.   Continues to stay active swimming in his pool and walking daily.  no associated soboe or cp.

## 2024-03-06 NOTE — REVIEW OF SYSTEMS
[Negative] : Heme/Lymph [Wheezing] : no wheezing [Shortness Of Breath] : no shortness of breath [Cough] : no cough

## 2024-03-06 NOTE — PHYSICAL EXAM
[Normal] : normal rate, regular rhythm, normal S1 and S2 and no murmur heard [No Edema] : there was no peripheral edema [Pedal Pulses Present] : the pedal pulses are present [No Extremity Clubbing/Cyanosis] : no extremity clubbing/cyanosis [Non Tender] : non-tender [Soft] : abdomen soft [Non-distended] : non-distended [No Focal Deficits] : no focal deficits [Normal Affect] : the affect was normal [Normal Mood] : the mood was normal

## 2024-03-08 ENCOUNTER — CLINICAL ADVICE (OUTPATIENT)
Age: 84
End: 2024-03-08

## 2024-03-08 LAB
ALBUMIN SERPL ELPH-MCNC: 4.4 G/DL
ALP BLD-CCNC: 79 U/L
ALT SERPL-CCNC: 20 U/L
ANION GAP SERPL CALC-SCNC: 13 MMOL/L
AST SERPL-CCNC: 25 U/L
BASOPHILS # BLD AUTO: 0.06 K/UL
BASOPHILS NFR BLD AUTO: 0.8 %
BILIRUB DIRECT SERPL-MCNC: 0.2 MG/DL
BILIRUB INDIRECT SERPL-MCNC: 0.2 MG/DL
BILIRUB SERPL-MCNC: 0.4 MG/DL
BUN SERPL-MCNC: 25 MG/DL
CALCIUM SERPL-MCNC: 9.3 MG/DL
CHLORIDE SERPL-SCNC: 105 MMOL/L
CHOLEST SERPL-MCNC: 107 MG/DL
CO2 SERPL-SCNC: 24 MMOL/L
CREAT SERPL-MCNC: 1.09 MG/DL
CRP SERPL-MCNC: <3 MG/L
EGFR: 67 ML/MIN/1.73M2
EOSINOPHIL # BLD AUTO: 0.24 K/UL
EOSINOPHIL NFR BLD AUTO: 3.1 %
ERYTHROCYTE [SEDIMENTATION RATE] IN BLOOD BY WESTERGREN METHOD: 7 MM/HR
ESTIMATED AVERAGE GLUCOSE: 123 MG/DL
GLUCOSE SERPL-MCNC: 94 MG/DL
HBA1C MFR BLD HPLC: 5.9 %
HCT VFR BLD CALC: 42.6 %
HDLC SERPL-MCNC: 49 MG/DL
HGB BLD-MCNC: 13.9 G/DL
IMM GRANULOCYTES NFR BLD AUTO: 0.4 %
LDLC SERPL CALC-MCNC: 43 MG/DL
LYMPHOCYTES # BLD AUTO: 1.63 K/UL
LYMPHOCYTES NFR BLD AUTO: 21.2 %
MAN DIFF?: NORMAL
MCHC RBC-ENTMCNC: 29.8 PG
MCHC RBC-ENTMCNC: 32.6 GM/DL
MCV RBC AUTO: 91.2 FL
MONOCYTES # BLD AUTO: 0.68 K/UL
MONOCYTES NFR BLD AUTO: 8.8 %
NEUTROPHILS # BLD AUTO: 5.05 K/UL
NEUTROPHILS NFR BLD AUTO: 65.7 %
NONHDLC SERPL-MCNC: 58 MG/DL
PLATELET # BLD AUTO: 417 K/UL
POTASSIUM SERPL-SCNC: 5 MMOL/L
PROT SERPL-MCNC: 7 G/DL
RBC # BLD: 4.67 M/UL
RBC # FLD: 13.1 %
SODIUM SERPL-SCNC: 142 MMOL/L
TRIGL SERPL-MCNC: 77 MG/DL
TSH SERPL-ACNC: 2.82 UIU/ML
URATE SERPL-MCNC: 4.3 MG/DL
WBC # FLD AUTO: 7.69 K/UL

## 2024-03-11 ENCOUNTER — CLINICAL ADVICE (OUTPATIENT)
Age: 84
End: 2024-03-11

## 2024-03-11 LAB — DSDNA AB SER-ACNC: 38 IU/ML

## 2024-03-18 DIAGNOSIS — J84.9 INTERSTITIAL PULMONARY DISEASE, UNSPECIFIED: ICD-10-CM

## 2024-03-22 ENCOUNTER — OUTPATIENT (OUTPATIENT)
Dept: OUTPATIENT SERVICES | Facility: HOSPITAL | Age: 84
LOS: 1 days | End: 2024-03-22
Payer: MEDICARE

## 2024-03-22 ENCOUNTER — APPOINTMENT (OUTPATIENT)
Dept: CT IMAGING | Facility: IMAGING CENTER | Age: 84
End: 2024-03-22
Payer: MEDICARE

## 2024-03-22 DIAGNOSIS — Z98.890 OTHER SPECIFIED POSTPROCEDURAL STATES: Chronic | ICD-10-CM

## 2024-03-22 DIAGNOSIS — J84.9 INTERSTITIAL PULMONARY DISEASE, UNSPECIFIED: ICD-10-CM

## 2024-03-22 DIAGNOSIS — Z95.5 PRESENCE OF CORONARY ANGIOPLASTY IMPLANT AND GRAFT: Chronic | ICD-10-CM

## 2024-03-22 PROCEDURE — 71260 CT THORAX DX C+: CPT | Mod: 26,MH

## 2024-03-22 PROCEDURE — 71260 CT THORAX DX C+: CPT

## 2024-03-28 ENCOUNTER — RX RENEWAL (OUTPATIENT)
Age: 84
End: 2024-03-28

## 2024-03-28 RX ORDER — LOSARTAN POTASSIUM 25 MG/1
25 TABLET, FILM COATED ORAL
Qty: 90 | Refills: 3 | Status: ACTIVE | COMMUNITY
Start: 2023-04-25 | End: 1900-01-01

## 2024-03-28 RX ORDER — PANTOPRAZOLE 40 MG/1
40 TABLET, DELAYED RELEASE ORAL
Qty: 90 | Refills: 1 | Status: ACTIVE | COMMUNITY
Start: 2023-01-09 | End: 1900-01-01

## 2024-04-01 ENCOUNTER — CLINICAL ADVICE (OUTPATIENT)
Age: 84
End: 2024-04-01

## 2024-04-04 ENCOUNTER — APPOINTMENT (OUTPATIENT)
Dept: INTERNAL MEDICINE | Facility: CLINIC | Age: 84
End: 2024-04-04
Payer: MEDICARE

## 2024-04-04 DIAGNOSIS — R05.9 COUGH, UNSPECIFIED: ICD-10-CM

## 2024-04-04 PROCEDURE — 99214 OFFICE O/P EST MOD 30 MIN: CPT

## 2024-04-04 RX ORDER — FLUTICASONE PROPIONATE 50 UG/1
50 SPRAY, METERED NASAL TWICE DAILY
Qty: 1 | Refills: 1 | Status: ACTIVE | COMMUNITY
Start: 2024-04-04 | End: 1900-01-01

## 2024-04-04 RX ORDER — BENZONATATE 100 MG/1
100 CAPSULE ORAL EVERY 8 HOURS
Qty: 30 | Refills: 0 | Status: ACTIVE | COMMUNITY
Start: 2024-04-04 | End: 1900-01-01

## 2024-04-04 RX ORDER — AZITHROMYCIN 250 MG/1
250 TABLET, FILM COATED ORAL
Qty: 1 | Refills: 0 | Status: ACTIVE | COMMUNITY
Start: 2024-04-04 | End: 1900-01-01

## 2024-04-04 NOTE — HISTORY OF PRESENT ILLNESS
[Home] : at home, [unfilled] , at the time of the visit. [Medical Office: ([facility])___] : at the medical office located in  [Verbal consent obtained from patient] : the patient, [unfilled] [FreeTextEntry8] : 83 year old male with sore throat and cough for a few days. Only taking tylenol.  It is a dry tickle cough.  No covid test.  NO SOB.  He has not taken any OTC meds.

## 2024-04-04 NOTE — ASSESSMENT
[FreeTextEntry1] : Recommend antibiotics, nasal saline, and guaifenesin. Side effect of treatment includes but not limited to diarrhea. Return if symptoms worsen or persist. .  Continue OTC meds.  call if not improving, will send to pulm or for cxray Due to nature of visit, full physical exam unable to be done to assess patient.

## 2024-04-04 NOTE — REVIEW OF SYSTEMS
[Nasal Discharge] : nasal discharge [Shortness Of Breath] : no shortness of breath [Wheezing] : no wheezing [Cough] : cough [Negative] : Cardiovascular

## 2024-04-16 ENCOUNTER — APPOINTMENT (OUTPATIENT)
Dept: OTOLARYNGOLOGY | Facility: CLINIC | Age: 84
End: 2024-04-16

## 2024-06-26 ENCOUNTER — RX RENEWAL (OUTPATIENT)
Age: 84
End: 2024-06-26

## 2024-06-26 RX ORDER — ALLOPURINOL 100 MG/1
100 TABLET ORAL DAILY
Qty: 90 | Refills: 3 | Status: ACTIVE | COMMUNITY
Start: 2021-03-11 | End: 1900-01-01

## 2024-09-03 ENCOUNTER — APPOINTMENT (OUTPATIENT)
Dept: INTERNAL MEDICINE | Facility: CLINIC | Age: 84
End: 2024-09-03
Payer: MEDICARE

## 2024-09-03 VITALS
SYSTOLIC BLOOD PRESSURE: 159 MMHG | HEART RATE: 50 BPM | BODY MASS INDEX: 27.32 KG/M2 | OXYGEN SATURATION: 98 % | DIASTOLIC BLOOD PRESSURE: 64 MMHG | HEIGHT: 66 IN | RESPIRATION RATE: 16 BRPM | WEIGHT: 170 LBS

## 2024-09-03 DIAGNOSIS — J84.9 INTERSTITIAL PULMONARY DISEASE, UNSPECIFIED: ICD-10-CM

## 2024-09-03 DIAGNOSIS — N40.0 BENIGN PROSTATIC HYPERPLASIA WITHOUT LOWER URINARY TRACT SYMPMS: ICD-10-CM

## 2024-09-03 DIAGNOSIS — M10.9 GOUT, UNSPECIFIED: ICD-10-CM

## 2024-09-03 DIAGNOSIS — I25.10 ATHEROSCLEROTIC HEART DISEASE OF NATIVE CORONARY ARTERY W/OUT ANGINA PECTORIS: ICD-10-CM

## 2024-09-03 DIAGNOSIS — M54.2 CERVICALGIA: ICD-10-CM

## 2024-09-03 PROCEDURE — G2211 COMPLEX E/M VISIT ADD ON: CPT

## 2024-09-03 PROCEDURE — 99214 OFFICE O/P EST MOD 30 MIN: CPT

## 2024-09-03 PROCEDURE — 36415 COLL VENOUS BLD VENIPUNCTURE: CPT

## 2024-09-03 NOTE — HISTORY OF PRESENT ILLNESS
[Spouse] : spouse [de-identified] : Cardio- Dr. Parra Pulm- Dr. Linn Rheum- Dr. Ngo  84 year old male with h/o CAD s/p stent placement in 2016, BPH, presents for follow-up.    overall is doing well.  no acute issues.   Continues to stay active swimming in his pool and walking daily.  no associated soboe or cp. Looking forward to several trips upcoming in the next few months  hasn't gotten repeat CT chest for 2 years.  dxed with ILD at that time.  was seeing Dr. Albaro Linn.  Recently seen.  States pulmonary function test were normal at that time.  No further workup indicated.  No records in chart, will reach out to have it faxed over to review.  +CESAR, Krystal-1 antibody.  seen by rheumatologist, workup was grossly unremarkable at that time but never followed up.

## 2024-09-03 NOTE — ASSESSMENT
[FreeTextEntry1] : //  Neck pain likely related to positioning, MSK -Advised to decrease hide of pillow -NSAIDs, such as ibuprofen, Tylenol help decrease swelling, pain, -Acupressure may be recommended to decrease pain and improve movement. Acupressure is pressure or localized massage to the area of your back pain. -Apply ice to affected area for 15 to 20 minutes every hour or as directed. Use an ice pack, or put crushed ice in a plastic bag. Cover it with a towel before you apply it to your skin. Ice helps prevent tissue damage and decreases pain. -Apply heat to affected area for 20 to 30 minutes every 2 hours for as many days as directed. Heat helps decrease pain and muscle spasms. -f/u if pain persists   BPH, status post cystoscopy.  Awaiting results.  Has appointment with the urology upcoming -Continue alfuzosin, Proscar -Follow-up as per urology  s/p ischemic colitis-resolved  -Continue asa, plavix and statin   ILD, mild.  +CESAR, +Krystal-1.  noted in 2022.  (2024) repeat ct chest - stable.   -rheum referral appreciated, no further intervention needed as per patient -will get results from pulmonologist, Dr Albaro Linn   CAD, s/p stent placement in 2016, cardiology-Dr. Parra received [facility]. -Continue meds as directed  BPH.-stable urologist, Dr. Leroy Candelario,  -Continue meds as directed   Gout, on allopurinol. Last attack 17 years ago.  -Continue allopurinol as directed

## 2024-09-04 LAB
ALBUMIN SERPL ELPH-MCNC: 4.4 G/DL
ALP BLD-CCNC: 104 U/L
ALT SERPL-CCNC: 27 U/L
ANION GAP SERPL CALC-SCNC: 9 MMOL/L
AST SERPL-CCNC: 28 U/L
BASOPHILS # BLD AUTO: 0.06 K/UL
BASOPHILS NFR BLD AUTO: 0.9 %
BILIRUB DIRECT SERPL-MCNC: 0.1 MG/DL
BILIRUB INDIRECT SERPL-MCNC: 0.2 MG/DL
BILIRUB SERPL-MCNC: 0.4 MG/DL
BUN SERPL-MCNC: 29 MG/DL
CALCIUM SERPL-MCNC: 8.8 MG/DL
CHLORIDE SERPL-SCNC: 108 MMOL/L
CHOLEST SERPL-MCNC: 97 MG/DL
CO2 SERPL-SCNC: 28 MMOL/L
CREAT SERPL-MCNC: 1.11 MG/DL
EGFR: 65 ML/MIN/1.73M2
EOSINOPHIL # BLD AUTO: 0.3 K/UL
EOSINOPHIL NFR BLD AUTO: 4.3 %
ESTIMATED AVERAGE GLUCOSE: 120 MG/DL
GLUCOSE SERPL-MCNC: 113 MG/DL
HBA1C MFR BLD HPLC: 5.8 %
HCT VFR BLD CALC: 41 %
HDLC SERPL-MCNC: 53 MG/DL
HGB BLD-MCNC: 13.4 G/DL
IMM GRANULOCYTES NFR BLD AUTO: 0.4 %
LDLC SERPL CALC-MCNC: 24 MG/DL
LYMPHOCYTES # BLD AUTO: 1.36 K/UL
LYMPHOCYTES NFR BLD AUTO: 19.5 %
MAN DIFF?: NORMAL
MCHC RBC-ENTMCNC: 29.8 PG
MCHC RBC-ENTMCNC: 32.7 GM/DL
MCV RBC AUTO: 91.3 FL
MONOCYTES # BLD AUTO: 0.58 K/UL
MONOCYTES NFR BLD AUTO: 8.3 %
NEUTROPHILS # BLD AUTO: 4.66 K/UL
NEUTROPHILS NFR BLD AUTO: 66.6 %
NONHDLC SERPL-MCNC: 43 MG/DL
PLATELET # BLD AUTO: 333 K/UL
POTASSIUM SERPL-SCNC: 4.8 MMOL/L
PROT SERPL-MCNC: 6.7 G/DL
RBC # BLD: 4.49 M/UL
RBC # FLD: 12.7 %
SODIUM SERPL-SCNC: 144 MMOL/L
TRIGL SERPL-MCNC: 100 MG/DL
TSH SERPL-ACNC: 3.63 UIU/ML
WBC # FLD AUTO: 6.99 K/UL

## 2024-09-24 ENCOUNTER — RX RENEWAL (OUTPATIENT)
Age: 84
End: 2024-09-24

## 2024-09-27 ENCOUNTER — NON-APPOINTMENT (OUTPATIENT)
Age: 84
End: 2024-09-27

## 2024-09-27 ENCOUNTER — APPOINTMENT (OUTPATIENT)
Dept: INTERNAL MEDICINE | Facility: CLINIC | Age: 84
End: 2024-09-27
Payer: MEDICARE

## 2024-09-27 VITALS
WEIGHT: 170 LBS | SYSTOLIC BLOOD PRESSURE: 163 MMHG | OXYGEN SATURATION: 95 % | DIASTOLIC BLOOD PRESSURE: 76 MMHG | HEART RATE: 58 BPM | HEIGHT: 66 IN | BODY MASS INDEX: 27.32 KG/M2

## 2024-09-27 DIAGNOSIS — M79.603 PAIN IN ARM, UNSPECIFIED: ICD-10-CM

## 2024-09-27 PROCEDURE — G2211 COMPLEX E/M VISIT ADD ON: CPT

## 2024-09-27 PROCEDURE — 93000 ELECTROCARDIOGRAM COMPLETE: CPT

## 2024-09-27 PROCEDURE — 99214 OFFICE O/P EST MOD 30 MIN: CPT

## 2024-09-27 NOTE — HISTORY OF PRESENT ILLNESS
[FreeTextEntry8] : 84 year old male with c/o Arm pain in his left arm for 5 days did not injur it  Motrin helps a little  its not getting better overall  he takes the motriin at night

## 2024-09-27 NOTE — PHYSICAL EXAM
[Regular Rhythm] : with a regular rhythm [Normal S1, S2] : normal S1 and S2 [Normal] : affect was normal and insight and judgment were intact

## 2024-09-30 ENCOUNTER — APPOINTMENT (OUTPATIENT)
Dept: ORTHOPEDIC SURGERY | Facility: CLINIC | Age: 84
End: 2024-09-30
Payer: MEDICARE

## 2024-09-30 VITALS
HEART RATE: 54 BPM | DIASTOLIC BLOOD PRESSURE: 81 MMHG | WEIGHT: 166 LBS | SYSTOLIC BLOOD PRESSURE: 184 MMHG | BODY MASS INDEX: 26.68 KG/M2 | HEIGHT: 66 IN

## 2024-09-30 DIAGNOSIS — M25.512 PAIN IN LEFT SHOULDER: ICD-10-CM

## 2024-09-30 PROCEDURE — 73030 X-RAY EXAM OF SHOULDER: CPT | Mod: LT

## 2024-09-30 PROCEDURE — 99203 OFFICE O/P NEW LOW 30 MIN: CPT

## 2024-09-30 NOTE — PHYSICAL EXAM
[Rad] : radial 2+ and symmetric bilaterally [Normal] : Alert and in no acute distress [Poor Appearance] : well-appearing [Acute Distress] : not in acute distress [Obese] : not obese [de-identified] : The patient has no respiratory distress. Mood and affect are normal. The patient is alert and oriented to person, place and time. Examination of the cervical spine demonstrates no deformity. There is tenderness of the left paracervical muscles and the left trapezius muscle. There is mild muscle spasm. Cervical spine range of motion is right lateral rotation of 40, left lateral rotation of 40, extension of 45 and flexion of 45. Upper extremity neurologic exam is intact with regard to sensation. Motor function is 5 over 5 in all groups in the upper extremities. Deep tendon reflexes are 2+ and equal at the biceps, triceps and brachial radialis. Examination of the left shoulder demonstrates no deformity. The skin is intact. There is no erythema. There is tenderness anteriorly. Impingement sign is positive There is no instability. Drop arm test is negative. Empty can test is negative. Liftoff test is negative. Wise test is negative. He has left shoulder elevation of 140 degrees, external rotation of 45 degrees and internal rotation to the mid lumbar level.  The elbows are stable and nontender.  The skin is intact.  There is no lymphedema. [de-identified] : AP, transscapular and axillary x-rays of the left shoulder demonstrate no fracture, no dislocation and no bony abnormality.

## 2024-09-30 NOTE — HISTORY OF PRESENT ILLNESS
[de-identified] : 84-year-old LHD male presents for evaluation of left shoulder pain x 10 days. Denies trauma or injury. He complains of constant throbbing pain in the left shoulder radiating down the arm worse with lifting the arm. He has tried Motrin with some relief. He denies paresthesias. Denies prior injuries.

## 2024-09-30 NOTE — DISCUSSION/SUMMARY
[de-identified] : The patient has left shoulder tendinitis and left arm pain.  I have discussed the pathology, natural history and treatment options with him.  He is started on a course of ibuprofen 400 mg 3 times a day after food.  Medication risks have been reviewed.  He will be reevaluated in 2 weeks.

## 2024-10-08 ENCOUNTER — RX RENEWAL (OUTPATIENT)
Age: 84
End: 2024-10-08

## 2024-10-14 ENCOUNTER — APPOINTMENT (OUTPATIENT)
Dept: ORTHOPEDIC SURGERY | Facility: CLINIC | Age: 84
End: 2024-10-14

## 2024-10-25 ENCOUNTER — APPOINTMENT (OUTPATIENT)
Dept: INTERNAL MEDICINE | Facility: CLINIC | Age: 84
End: 2024-10-25

## 2024-11-14 ENCOUNTER — RX RENEWAL (OUTPATIENT)
Age: 84
End: 2024-11-14

## 2024-11-25 DIAGNOSIS — R05.9 COUGH, UNSPECIFIED: ICD-10-CM

## 2024-11-25 RX ORDER — AZITHROMYCIN 250 MG/1
250 TABLET, FILM COATED ORAL
Qty: 1 | Refills: 0 | Status: ACTIVE | COMMUNITY
Start: 2024-11-25 | End: 1900-01-01

## 2024-11-25 RX ORDER — BENZONATATE 100 MG/1
100 CAPSULE ORAL 3 TIMES DAILY
Qty: 21 | Refills: 0 | Status: ACTIVE | COMMUNITY
Start: 2024-11-25 | End: 1900-01-01

## 2025-01-13 ENCOUNTER — APPOINTMENT (OUTPATIENT)
Dept: INTERNAL MEDICINE | Facility: CLINIC | Age: 85
End: 2025-01-13
Payer: MEDICARE

## 2025-01-13 ENCOUNTER — LABORATORY RESULT (OUTPATIENT)
Age: 85
End: 2025-01-13

## 2025-01-13 VITALS
OXYGEN SATURATION: 98 % | SYSTOLIC BLOOD PRESSURE: 137 MMHG | WEIGHT: 172 LBS | DIASTOLIC BLOOD PRESSURE: 57 MMHG | HEART RATE: 57 BPM | BODY MASS INDEX: 30.48 KG/M2 | HEIGHT: 63 IN

## 2025-01-13 DIAGNOSIS — Z00.00 ENCOUNTER FOR GENERAL ADULT MEDICAL EXAMINATION W/OUT ABNORMAL FINDINGS: ICD-10-CM

## 2025-01-13 DIAGNOSIS — Z23 ENCOUNTER FOR IMMUNIZATION: ICD-10-CM

## 2025-01-13 PROCEDURE — G0439: CPT

## 2025-01-13 PROCEDURE — 36415 COLL VENOUS BLD VENIPUNCTURE: CPT

## 2025-01-14 ENCOUNTER — CLINICAL ADVICE (OUTPATIENT)
Age: 85
End: 2025-01-14

## 2025-01-14 LAB
ALBUMIN SERPL ELPH-MCNC: 4.3 G/DL
ALP BLD-CCNC: 85 U/L
ALT SERPL-CCNC: 21 U/L
ANION GAP SERPL CALC-SCNC: 9 MMOL/L
APPEARANCE: CLEAR
AST SERPL-CCNC: 26 U/L
BACTERIA: NEGATIVE /HPF
BASOPHILS # BLD AUTO: 0.05 K/UL
BASOPHILS NFR BLD AUTO: 0.6 %
BILIRUB DIRECT SERPL-MCNC: 0.2 MG/DL
BILIRUB INDIRECT SERPL-MCNC: 0.5 MG/DL
BILIRUB SERPL-MCNC: 0.7 MG/DL
BILIRUBIN URINE: NEGATIVE
BLOOD URINE: NEGATIVE
BUN SERPL-MCNC: 24 MG/DL
CALCIUM SERPL-MCNC: 9.3 MG/DL
CAST: 0 /LPF
CHLORIDE SERPL-SCNC: 105 MMOL/L
CHOLEST SERPL-MCNC: 127 MG/DL
CO2 SERPL-SCNC: 27 MMOL/L
COLOR: YELLOW
CREAT SERPL-MCNC: 1.15 MG/DL
EGFR: 62 ML/MIN/1.73M2
EOSINOPHIL # BLD AUTO: 0.3 K/UL
EOSINOPHIL NFR BLD AUTO: 3.4 %
EPITHELIAL CELLS: 0 /HPF
ESTIMATED AVERAGE GLUCOSE: 126 MG/DL
GLUCOSE QUALITATIVE U: NEGATIVE MG/DL
GLUCOSE SERPL-MCNC: 95 MG/DL
HBA1C MFR BLD HPLC: 6 %
HCT VFR BLD CALC: 45 %
HDLC SERPL-MCNC: 61 MG/DL
HGB BLD-MCNC: 14.5 G/DL
IMM GRANULOCYTES NFR BLD AUTO: 0.5 %
KETONES URINE: NEGATIVE MG/DL
LDLC SERPL CALC-MCNC: 51 MG/DL
LEUKOCYTE ESTERASE URINE: NEGATIVE
LYMPHOCYTES # BLD AUTO: 1.71 K/UL
LYMPHOCYTES NFR BLD AUTO: 19.3 %
MAN DIFF?: NORMAL
MCHC RBC-ENTMCNC: 29.6 PG
MCHC RBC-ENTMCNC: 32.2 G/DL
MCV RBC AUTO: 91.8 FL
MICROSCOPIC-UA: NORMAL
MONOCYTES # BLD AUTO: 0.8 K/UL
MONOCYTES NFR BLD AUTO: 9 %
NEUTROPHILS # BLD AUTO: 5.98 K/UL
NEUTROPHILS NFR BLD AUTO: 67.2 %
NITRITE URINE: NEGATIVE
NONHDLC SERPL-MCNC: 66 MG/DL
PH URINE: 5.5
PLATELET # BLD AUTO: 340 K/UL
POTASSIUM SERPL-SCNC: 4.9 MMOL/L
PROT SERPL-MCNC: 6.8 G/DL
PROTEIN URINE: NEGATIVE MG/DL
RBC # BLD: 4.9 M/UL
RBC # FLD: 13.2 %
RED BLOOD CELLS URINE: 1 /HPF
SODIUM SERPL-SCNC: 140 MMOL/L
SPECIFIC GRAVITY URINE: 1.02
TRIGL SERPL-MCNC: 77 MG/DL
TSH SERPL-ACNC: 5.65 UIU/ML
UROBILINOGEN URINE: 0.2 MG/DL
WBC # FLD AUTO: 8.88 K/UL
WHITE BLOOD CELLS URINE: 0 /HPF

## 2025-02-19 NOTE — H&P ADULT - NSVTERISKREFERASSESS_GEN_ALL_CORE
{PROVIDER CHARTING PREFERENCE:721299}    Subjective   Kaleb is a 67 year old, presenting for the following health issues:  Dizziness      2/19/2025    10:46 AM   Additional Questions   Roomed by Edda CARLTON CMA     History of Present Illness       Reason for visit:  Sudden onset of veritigo  Symptom onset:  1-3 days ago  Symptoms include:  Dizziness, lack of balance  Symptom intensity:  Moderate  Symptom progression:  Staying the same  Had these symptoms before:  No  What makes it worse:  Sudden movement  What makes it better:  Staying still   He is taking medications regularly.       {MA/LPN/RN Pre-Provider Visit Orders- hCG/UA/Strep (Optional):890997}  {SUPERLIST (Optional):679183}  {additonal problems for provider to add (Optional):770824}    {ROS Picklists (Optional):024167}      Objective    /66 (BP Location: Right arm, Patient Position: Sitting, Cuff Size: Adult Large)   Pulse 69   Temp 97.9  F (36.6  C) (Tympanic)   Resp 18   Ht 1.829 m (6')   Wt 103.4 kg (228 lb)   SpO2 95%   BMI 30.92 kg/m    Body mass index is 30.92 kg/m .  Physical Exam   {Exam List (Optional):595569}    {Diagnostic Test Results (Optional):960567}        Signed Electronically by: Bong Vanegas MD, MD  {Email feedback regarding this note to [email or web address]   :[id number]}   "progression:  Staying the same  Had these symptoms before:  No  What makes it worse:  Sudden movement  What makes it better:  Staying still   He is taking medications regularly.     The patient awoke last Friday and noted abrupt dizziness when turning to get out of bed. He describes having a recurrent sense of his \"head spinning\" or \"losing his balance\". No associated nausea. No ear ringing or discomfort, no hearing changes.   No sense of rapid pulse or palpitations, chest discomfort or dyspnea.   No amaurosis, diplopia, dysarthria or aphasia, lateralizing weakness or paresthesias.     He reports being diagnosed with prostate cancer in August 2022 for which he has followed with Dr López. He is scheduled for follow up with him on 2/27.     Past medical, family and social histories as well as medications reviewed and updated as needed.    REVIEW OF SYSTEMS: The following systems have been completely reviewed and are negative except as noted above:   Constitutional, HEENT, respiratory, cardiovascular, gastrointestinal, genitourinary, musculoskeletal, and neurologic systems.        Objective    /66 (BP Location: Right arm, Patient Position: Sitting, Cuff Size: Adult Large)   Pulse 69   Temp 97.9  F (36.6  C) (Tympanic)   Resp 18   Ht 1.829 m (6')   Wt 103.4 kg (228 lb)   SpO2 95%   BMI 30.92 kg/m    Body mass index is 30.92 kg/m .    Physical Exam   GENERAL: alert and no distress  EYES: Eyes grossly normal to inspection, PERRL and conjunctivae and sclerae normal  NECK: no adenopathy, no asymmetry, masses, or scars  RESP: lungs clear to auscultation - no rales, rhonchi or wheezes  CV: regular rate and rhythm, normal S1 S2, no S3 or S4, no murmur, click or rub, no peripheral edema  MS: no gross musculoskeletal defects noted, no edema  NEURO: Normal strength and tone, mentation intact and speech normal  PSYCH: mentation appears normal, affect normal/bright        Signed Electronically by: Bong Vanegas MD, " Refer to the Assessment tab to view/cancel completed assessment.

## 2025-03-17 ENCOUNTER — RX RENEWAL (OUTPATIENT)
Age: 85
End: 2025-03-17

## 2025-05-19 DIAGNOSIS — R05.9 COUGH, UNSPECIFIED: ICD-10-CM

## 2025-05-19 RX ORDER — FLUTICASONE PROPIONATE 50 UG/1
50 SPRAY, METERED NASAL TWICE DAILY
Qty: 1 | Refills: 0 | Status: ACTIVE | COMMUNITY
Start: 2025-05-19 | End: 1900-01-01

## 2025-06-16 ENCOUNTER — RX RENEWAL (OUTPATIENT)
Age: 85
End: 2025-06-16

## 2025-07-14 ENCOUNTER — LABORATORY RESULT (OUTPATIENT)
Age: 85
End: 2025-07-14

## 2025-07-14 ENCOUNTER — APPOINTMENT (OUTPATIENT)
Dept: INTERNAL MEDICINE | Facility: CLINIC | Age: 85
End: 2025-07-14
Payer: MEDICARE

## 2025-07-14 VITALS
HEIGHT: 63 IN | WEIGHT: 170 LBS | BODY MASS INDEX: 30.12 KG/M2 | HEART RATE: 57 BPM | SYSTOLIC BLOOD PRESSURE: 155 MMHG | DIASTOLIC BLOOD PRESSURE: 74 MMHG | OXYGEN SATURATION: 96 %

## 2025-07-14 PROBLEM — E78.5 HYPERLIPIDEMIA, UNSPECIFIED HYPERLIPIDEMIA TYPE: Status: ACTIVE | Noted: 2025-07-14

## 2025-07-14 PROCEDURE — 99214 OFFICE O/P EST MOD 30 MIN: CPT

## 2025-07-14 PROCEDURE — 36415 COLL VENOUS BLD VENIPUNCTURE: CPT

## 2025-07-14 PROCEDURE — G2211 COMPLEX E/M VISIT ADD ON: CPT

## 2025-07-15 PROBLEM — R79.89 ELEVATED SERUM CREATININE: Status: ACTIVE | Noted: 2025-07-15

## 2025-07-15 LAB
ALBUMIN SERPL ELPH-MCNC: 4.4 G/DL
ALP BLD-CCNC: 93 U/L
ALT SERPL-CCNC: 37 U/L
ANION GAP SERPL CALC-SCNC: 11 MMOL/L
AST SERPL-CCNC: 47 U/L
BASOPHILS # BLD AUTO: 0.07 K/UL
BASOPHILS NFR BLD AUTO: 0.9 %
BILIRUB DIRECT SERPL-MCNC: 0.2 MG/DL
BILIRUB INDIRECT SERPL-MCNC: 0.2 MG/DL
BILIRUB SERPL-MCNC: 0.4 MG/DL
BUN SERPL-MCNC: 35 MG/DL
CALCIUM SERPL-MCNC: 9.1 MG/DL
CHLORIDE SERPL-SCNC: 108 MMOL/L
CHOLEST SERPL-MCNC: 103 MG/DL
CO2 SERPL-SCNC: 24 MMOL/L
CREAT SERPL-MCNC: 1.36 MG/DL
EGFRCR SERPLBLD CKD-EPI 2021: 51 ML/MIN/1.73M2
EOSINOPHIL # BLD AUTO: 0.29 K/UL
EOSINOPHIL NFR BLD AUTO: 3.8 %
ESTIMATED AVERAGE GLUCOSE: 123 MG/DL
GLUCOSE SERPL-MCNC: 87 MG/DL
HBA1C MFR BLD HPLC: 5.9 %
HCT VFR BLD CALC: 40.9 %
HDLC SERPL-MCNC: 51 MG/DL
HGB BLD-MCNC: 13.4 G/DL
IMM GRANULOCYTES NFR BLD AUTO: 0.5 %
LDLC SERPL-MCNC: 40 MG/DL
LYMPHOCYTES # BLD AUTO: 1.43 K/UL
LYMPHOCYTES NFR BLD AUTO: 18.8 %
MAN DIFF?: NORMAL
MCHC RBC-ENTMCNC: 30 PG
MCHC RBC-ENTMCNC: 32.8 G/DL
MCV RBC AUTO: 91.5 FL
MONOCYTES # BLD AUTO: 0.67 K/UL
MONOCYTES NFR BLD AUTO: 8.8 %
NEUTROPHILS # BLD AUTO: 5.09 K/UL
NEUTROPHILS NFR BLD AUTO: 67.2 %
NONHDLC SERPL-MCNC: 53 MG/DL
PLATELET # BLD AUTO: 393 K/UL
POTASSIUM SERPL-SCNC: 5 MMOL/L
PROT SERPL-MCNC: 6.8 G/DL
RBC # BLD: 4.47 M/UL
RBC # FLD: 12.8 %
SODIUM SERPL-SCNC: 143 MMOL/L
TRIGL SERPL-MCNC: 54 MG/DL
TSH SERPL-ACNC: 4.55 UIU/ML
URATE SERPL-MCNC: 4.4 MG/DL
WBC # FLD AUTO: 7.59 K/UL

## 2025-07-24 ENCOUNTER — CLINICAL ADVICE (OUTPATIENT)
Age: 85
End: 2025-07-24

## 2025-07-24 LAB
ANION GAP SERPL CALC-SCNC: 13 MMOL/L
BUN SERPL-MCNC: 22 MG/DL
CALCIUM SERPL-MCNC: 9.4 MG/DL
CHLORIDE SERPL-SCNC: 103 MMOL/L
CO2 SERPL-SCNC: 26 MMOL/L
CREAT SERPL-MCNC: 1.15 MG/DL
EGFRCR SERPLBLD CKD-EPI 2021: 62 ML/MIN/1.73M2
GLUCOSE SERPL-MCNC: 105 MG/DL
POTASSIUM SERPL-SCNC: 5.1 MMOL/L
SODIUM SERPL-SCNC: 142 MMOL/L

## 2025-09-12 ENCOUNTER — RX RENEWAL (OUTPATIENT)
Age: 85
End: 2025-09-12

## (undated) DEVICE — BIOPSY FORCEP COLD DISP

## (undated) DEVICE — SALIVA EJECTOR (BLUE)

## (undated) DEVICE — LINE BREATHE SAMPLNG

## (undated) DEVICE — FACESHIELD FULL VISOR

## (undated) DEVICE — GOWN LG

## (undated) DEVICE — ELCTR ECG CONDUCTIVE ADHESIVE

## (undated) DEVICE — CLAMP BX HOT RAD JAW 3

## (undated) DEVICE — TUBING MEDI-VAC W MAXIGRIP CONNECTORS 1/4"X6'

## (undated) DEVICE — CATH IV SAFE BC 22G X 1" (BLUE)

## (undated) DEVICE — ELCTR GROUNDING PAD ADULT COVIDIEN

## (undated) DEVICE — BIOPSY FORCEP RADIAL JAW 4 STANDARD WITH NEEDLE

## (undated) DEVICE — TUBING IV SET GRAVITY 3Y 100" MACRO

## (undated) DEVICE — DRSG 2X2

## (undated) DEVICE — BASIN EMESIS 10IN GRADUATED MAUVE

## (undated) DEVICE — CONTAINER FORMALIN 10% 60ML

## (undated) DEVICE — PACK IV START WITH CHG

## (undated) DEVICE — DRSG BANDAID 0.75X3"

## (undated) DEVICE — DRSG CURITY GAUZE SPONGE 4 X 4" 12-PLY NON-STERILE

## (undated) DEVICE — CONTAINER FORMALIN 80ML YELLOW

## (undated) DEVICE — TUBING SUCTION NONCONDUCTIVE 6MM X 12FT

## (undated) DEVICE — LUBRICATING JELLY HR ONE SHOT 3G